# Patient Record
Sex: FEMALE | Race: WHITE | NOT HISPANIC OR LATINO | Employment: FULL TIME | URBAN - METROPOLITAN AREA
[De-identification: names, ages, dates, MRNs, and addresses within clinical notes are randomized per-mention and may not be internally consistent; named-entity substitution may affect disease eponyms.]

---

## 2017-03-06 DIAGNOSIS — F51.01 PRIMARY INSOMNIA: ICD-10-CM

## 2017-03-06 DIAGNOSIS — G47.9 SLEEP DISTURBANCE: ICD-10-CM

## 2017-03-06 RX ORDER — ZOLPIDEM TARTRATE 5 MG/1
5 TABLET ORAL
Qty: 90 TABLET | Refills: 0 | Status: SHIPPED | OUTPATIENT
Start: 2017-03-06 | End: 2017-05-31

## 2017-03-06 NOTE — TELEPHONE ENCOUNTER
ambien 5mg      Last Written Prescription Date:  11/30/16  Last Fill Quantity: 90,   # refills: 0  Last Office Visit with [facility] or [facility] prescribing provider: 11/27/16  Future Office visit:       Routing refill request to provider for review/approval because:  Drug not on the [facility], [facility] or [facility] refill protocol or controlled substance    Thank You~  Priscila Perez [facility]

## 2017-03-07 NOTE — TELEPHONE ENCOUNTER
Pt called to fu on RX as pt took her last pill last night.  Please advise  Thank you,  Damaris Ching  Patient Representative

## 2017-05-31 DIAGNOSIS — G47.9 SLEEP DISTURBANCE: ICD-10-CM

## 2017-05-31 DIAGNOSIS — F51.01 PRIMARY INSOMNIA: ICD-10-CM

## 2017-05-31 RX ORDER — ZOLPIDEM TARTRATE 5 MG/1
5 TABLET ORAL
Qty: 90 TABLET | Refills: 0 | Status: SHIPPED | OUTPATIENT
Start: 2017-05-31 | End: 2017-08-31

## 2017-05-31 NOTE — TELEPHONE ENCOUNTER
Zolpidem 5mg      Last Written Prescription Date: 3/6/2017  Last Fill Quantity: 90,  # refills: 0   Last Office Visit with [facility], [facility] or [facility] prescribing provider: 10/27/2016                                             Please fax or bring signed prescription to [facility].    Thank you,  [facility]

## 2017-08-31 DIAGNOSIS — G47.9 SLEEP DISTURBANCE: ICD-10-CM

## 2017-08-31 DIAGNOSIS — F51.01 PRIMARY INSOMNIA: ICD-10-CM

## 2017-08-31 RX ORDER — ZOLPIDEM TARTRATE 5 MG/1
5 TABLET ORAL
Qty: 90 TABLET | Refills: 0 | Status: SHIPPED | OUTPATIENT
Start: 2017-08-31 | End: 2017-10-12

## 2017-08-31 NOTE — TELEPHONE ENCOUNTER
Ambien      Last Written Prescription Date: 05/31/2017  Last Fill Quantity: 90,  # refills: 0   Last Office Visit with [facility] prescribing provider: 10/27/2016                                         Next 5 appointments (look out 90 days)     Oct 12, 2017  9:00 AM CDT   [facility]YSICAL with Kathy Chávez PA-C   [facility]    [address] [place] [id number] [phone]                  Thank You,  Gareth Rodriguez, Pharmacy Tech  [facility]

## 2017-10-12 ENCOUNTER — OFFICE VISIT (OUTPATIENT)
Dept: FAMILY MEDICINE | Facility: CLINIC | Age: 32
End: 2017-10-12
Payer: COMMERCIAL

## 2017-10-12 VITALS
BODY MASS INDEX: 33.92 KG/M2 | HEIGHT: 69 IN | WEIGHT: 229 LBS | OXYGEN SATURATION: 98 % | HEART RATE: 87 BPM | DIASTOLIC BLOOD PRESSURE: 82 MMHG | TEMPERATURE: 97.2 F | SYSTOLIC BLOOD PRESSURE: 122 MMHG | RESPIRATION RATE: 18 BRPM

## 2017-10-12 DIAGNOSIS — G47.9 SLEEP DISTURBANCE: ICD-10-CM

## 2017-10-12 DIAGNOSIS — L71.9 ROSACEA: ICD-10-CM

## 2017-10-12 DIAGNOSIS — Z00.01 ENCOUNTER FOR ROUTINE ADULT HEALTH EXAMINATION WITH ABNORMAL FINDINGS: Primary | ICD-10-CM

## 2017-10-12 DIAGNOSIS — F51.01 PRIMARY INSOMNIA: ICD-10-CM

## 2017-10-12 DIAGNOSIS — Z23 NEED FOR PROPHYLACTIC VACCINATION AND INOCULATION AGAINST INFLUENZA: ICD-10-CM

## 2017-10-12 DIAGNOSIS — L98.9 SKIN LESION: ICD-10-CM

## 2017-10-12 PROBLEM — E66.811 OBESITY (BMI 30.0-34.9): Status: ACTIVE | Noted: 2017-10-12

## 2017-10-12 LAB
CHOLEST SERPL-MCNC: 178 MG/DL
HDLC SERPL-MCNC: 61 MG/DL
LDLC SERPL CALC-MCNC: 83 MG/DL
NONHDLC SERPL-MCNC: 117 MG/DL
TRIGL SERPL-MCNC: 168 MG/DL

## 2017-10-12 PROCEDURE — 99395 PREV VISIT EST AGE 18-39: CPT | Mod: 25 | Performed by: PHYSICIAN ASSISTANT

## 2017-10-12 PROCEDURE — 90686 IIV4 VACC NO PRSV 0.5 ML IM: CPT | Performed by: PHYSICIAN ASSISTANT

## 2017-10-12 PROCEDURE — 80061 LIPID PANEL: CPT | Performed by: PHYSICIAN ASSISTANT

## 2017-10-12 PROCEDURE — 90471 IMMUNIZATION ADMIN: CPT | Performed by: PHYSICIAN ASSISTANT

## 2017-10-12 PROCEDURE — 36415 COLL VENOUS BLD VENIPUNCTURE: CPT | Performed by: PHYSICIAN ASSISTANT

## 2017-10-12 RX ORDER — ZOLPIDEM TARTRATE 5 MG/1
5 TABLET ORAL
Qty: 90 TABLET | Refills: 0 | Status: SHIPPED | OUTPATIENT
Start: 2017-10-12 | End: 2018-03-04

## 2017-10-12 ASSESSMENT — PAIN SCALES - GENERAL: PAINLEVEL: NO PAIN (0)

## 2017-10-12 NOTE — PROGRESS NOTES
Radha - your labs are completely normal. Slightly elevated triglyceride level - suggest fish oil daily.

## 2017-10-12 NOTE — MR AVS SNAPSHOT
After Visit Summary   10/12/2017    Radha Etienne    MRN: [id number]           Patient Information     Date Of Birth          1985        Visit Information        Provider Department      10/12/2017 9:00 AM Kathy Chávez PA-C [facility]        Today's Diagnoses     Rosacea    -  1    Primary insomnia        Sleep disturbance        Skin lesion        Encounter for routine adult health examination without abnormal findings          Care Instructions      Preventive Health Recommendations  Female Ages 26 - 39  Yearly exam:   See your health care provider every year in order to    Review health changes.     Discuss preventive care.      Review your medicines if you your doctor has prescribed any.    Until age 30: Get a Pap test every three years (more often if you have had an abnormal result).    After age 30: Talk to your doctor about whether you should have a Pap test every 3 years or have a Pap test with HPV screening every 5 years.   You do not need a Pap test if your uterus was removed (hysterectomy) and you have not had cancer.  You should be tested each year for STDs (sexually transmitted diseases), if you're at risk.   Talk to your provider about how often to have your cholesterol checked.  If you are at risk for diabetes, you should have a diabetes test (fasting glucose).  Shots: Get a flu shot each year. Get a tetanus shot every 10 years.   Nutrition:     Eat at least 5 servings of fruits and vegetables each day.    Eat whole-grain bread, whole-wheat pasta and brown rice instead of white grains and rice.    Talk to your provider about Calcium and Vitamin D.     Lifestyle    Exercise at least 150 minutes a week (30 minutes a day, 5 days of the week). This will help you control your weight and prevent disease.    Limit alcohol to one drink per day.    No smoking.     Wear sunscreen to prevent skin cancer.    See your dentist every six months for an exam and  cleaning.            Follow-ups after your visit        Additional Services     DERMATOLOGY REFERRAL       Your provider has referred you to: BERAT: [facility] [phone]   [email or web address]    Please be aware that coverage of these services is subject to the terms and limitations of your health insurance plan.  Call member services at your health plan with any benefit or coverage questions.      Please bring the following with you to your appointment:    (1) Any X-Rays, CTs or MRIs which have been performed.  Contact the facility where they were done to arrange for  prior to your scheduled appointment.    (2) List of current medications  (3) This referral request   (4) Any documents/labs given to you for this referral                  Who to contact     If you have questions or need follow up information about today's clinic visit or your schedule please contact [facility] directly at [phone].  Normal or non-critical lab and imaging results will be communicated to you by MyChart, letter or phone within 4 business days after the clinic has received the results. If you do not hear from us within 7 days, please contact the clinic through CITIAhart or phone. If you have a critical or abnormal lab result, we will notify you by phone as soon as possible.  Submit refill requests through [employer] or call your pharmacy and they will forward the refill request to us. Please allow 3 business days for your refill to be completed.          Additional Information About Your Visit        CITIAhar[employer]Surance Information     [employer] gives you secure access to your electronic health record. If you see a primary care provider, you can also send messages to your care team and make appointments. If you have questions, please call your primary care clinic.  If you do not have a primary care provider, please call [phone] and they will assist you.        Care  "EveryWhere ID     This is your Care EveryWhere ID. This could be used by other organizations to access your [facility] medical records  [id number]        Your Vitals Were     Pulse Temperature Respirations Height Pulse Oximetry BMI (Body Mass Index)    87 97.2  F (36.2  C) (Tympanic) 18 5' 8.6\" (1.742 m) 98% 34.21 kg/m2       Blood Pressure from Last 3 Encounters:   10/12/17 122/82   10/27/16 110/60   10/05/16 122/82    Weight from Last 3 Encounters:   10/12/17 229 lb (103.9 kg)   10/27/16 256 lb 4 oz (116.2 kg)   10/05/16 248 lb (112.5 kg)              We Performed the Following     DERMATOLOGY REFERRAL     Lipid Profile (Chol, Trig, HDL, LDL calc)          Where to get your medicines      Some of these will need a paper prescription and others can be bought over the counter.  Ask your nurse if you have questions.     Bring a paper prescription for each of these medications     zolpidem 5 MG tablet          Primary Care Provider Office Phone # Fax #    Kathy Chávez PA-C [phone] [address] [place] [address]        Equal Access to Services     BARBARA YEN AH: Hadii tj chamberso Sodevanali, waaxda luqadaha, qaybta kaalmada adeegyada, jordan weller. So [facility] [phone].    ATENCIÓN: Si habla español, tiene a nassar disposición servicios gratuitos de asistencia lingüística. Nicki al [phone].    We comply with applicable federal civil rights laws and [place] laws. We do not discriminate on the basis of race, color, national origin, age, disability, sex, sexual orientation, or gender identity.            Thank you!     Thank you for choosing [facility]  for your care. Our goal is always to provide you with excellent care. Hearing back from our patients is one way we can continue to improve our services. Please take a few minutes to complete the written survey that you may receive in the mail after your visit with us. Thank you!           "   Your Updated Medication List - Protect others around you: Learn how to safely use, store and throw away your medicines at www.disposemymeds.org.          This list is accurate as of: 10/12/17  9:41 AM.  Always use your most recent med list.                   Brand Name Dispense Instructions for use Diagnosis    augmented betamethasone dipropionate 0.05 % cream    DIPROLENE-AF    50 g    APPLY TOPICALLY TO RASH TWO TIMES A DAY    Rash       levonorgestrel 20 MCG/24HR IUD    MIRENA    1 each    1 each (20 mcg) by Intrauterine route once for 1 dose    Intrauterine device surveillance       zolpidem 5 MG tablet    AMBIEN    90 tablet    Take 1 tablet (5 mg) by mouth nightly as needed for sleep    Primary insomnia, Sleep disturbance

## 2017-10-12 NOTE — NURSING NOTE
"Chief Complaint   Patient presents with     Physical       Initial /82  Pulse 87  Temp 97.2  F (36.2  C) (Tympanic)  Resp 18  Ht 5' 8.6\" (1.742 m)  Wt 229 lb (103.9 kg)  SpO2 98%  BMI 34.21 kg/m2 Estimated body mass index is 34.21 kg/(m^2) as calculated from the following:    Height as of this encounter: 5' 8.6\" (1.742 m).    Weight as of this encounter: 229 lb (103.9 kg).  BP completed using cuff size: jaky Ramires MA      "

## 2017-10-12 NOTE — PROGRESS NOTES
SUBJECTIVE:   CC: Radha Etienne is an 32 year old woman who presents for preventive health visit.     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 Uses this every day for sleep. Does need refills.      Today's PHQ-2 Score:   PHQ-2 ( 1999 Pfizer) 10/7/2017   Q1: Little interest or pleasure in doing things 0   Q2: Feeling down, depressed or hopeless 0   PHQ-2 Score 0   Q1: Little interest or pleasure in doing things Not at all   Q2: Feeling down, depressed or hopeless Not at all   PHQ-2 Score 0       Abuse: Current or Past(Physical, Sexual or Emotional)- No  Do you feel safe in your environment - Yes    Social History   Substance Use Topics     Smoking status: Never Smoker     Smokeless tobacco: Never Used     Alcohol use Yes      Comment: occassionally when not pregnant.     The patient does not drink >3 drinks per day nor >7 drinks per week.    Reviewed orders with patient.  Reviewed health maintenance and updated orders accordingly - Yes  Patient Active Problem List   Diagnosis     CARDIOVASCULAR SCREENING; LDL GOAL LESS THAN 160     Sleep disturbance     Encounter for IUD insertion     Primary insomnia     Rosacea     Past Surgical History:   Procedure Laterality Date     HC TOOTH EXTRACTION W/FORCEP      wisdom teeth       Social History   Substance Use Topics     Smoking status: Never Smoker     Smokeless tobacco: Never Used     Alcohol use Yes      Comment: occassionally when not pregnant.     Family History    Problem Relation Age of Onset     Obesity Mother      Breast Cancer Maternal Grandmother      60's     CANCER Maternal Grandmother      bladder and ovarian     Thyroid Disease Maternal Grandmother      Obesity Maternal Grandmother      Hypertension Maternal Grandfather      HEART DISEASE Maternal Grandfather      Bypass surgery     Lipids Maternal Grandfather      Alcohol/Drug Maternal Grandfather      Recovered alcoholic     Hypertension Paternal Grandfather      Depression Brother      bi-polar     Asthma Brother      childhood             Mammogram not appropriate for this patient based on age.    Pertinent mammograms are reviewed under the imaging tab.  History of abnormal Pap smear: NO - age 30-65 PAP every 5 years with negative HPV co-testing recommended    Reviewed and updated as needed this [facility]  Meds         Reviewed and updated as needed this visit by Provider            ROS:  C: NEGATIVE for fever, chills, change in weight  INTEGUMENTARY/SKIN: Persistent facial rash with previous diagnosis of rosacea untreated; growing skin lesion right buttock  E: NEGATIVE for vision changes or irritation  ENT: NEGATIVE for ear, mouth and throat problems  R: NEGATIVE for significant cough or SOB  B: NEGATIVE for masses, tenderness or discharge  CV: NEGATIVE for chest pain, palpitations or peripheral edema  GI: NEGATIVE for nausea, abdominal pain, heartburn, or change in bowel habits  : NEGATIVE for unusual urinary or vaginal symptoms. Periods are regular.  M: NEGATIVE for significant arthralgias or myalgia  N: NEGATIVE for weakness, dizziness or paresthesias  E: NEGATIVE for temperature intolerance, skin/hair changes  H: NEGATIVE for bleeding problems  P: NEGATIVE for changes in mood or affect     OBJECTIVE:   There were no vitals taken for this visit.  EXAM:  GENERAL: healthy, alert and no distress  EYES: Eyes grossly normal to inspection, PERRL and conjunctivae and sclerae  normal  HENT: ear canals and TM's normal, nose and mouth without ulcers or lesions  NECK: no adenopathy, no asymmetry, masses, or scars and thyroid normal to palpation  RESP: lungs clear to auscultation - no rales, rhonchi or wheezes  BREAST: normal without masses, tenderness or nipple discharge and no palpable axillary masses or adenopathy  CV: regular rate and rhythm, normal S1 S2, no S3 or S4, no murmur, click or rub, no peripheral edema and peripheral pulses strong  ABDOMEN: soft, nontender, no hepatosplenomegaly, no masses and bowel sounds normal   (female): normal female external genitalia, normal urethral meatus, vaginal mucosa pink, moist, well rugated, and normal cervix/adnexa/uterus without masses or discharge   (female): normal female external genitalia, normal urethral meatus , vaginal mucosa pink, moist, well rugated and cervix appears normal-IUD strings are extremely difficult to see approximately 2 mm long.  MS: no gross musculoskeletal defects noted, no edema  SKIN: 1.5 cm skin toned raised oval lesion right upper buttock.  Papular rosacea noted bilateral maxillary regions sparing the nose.  NEURO: Normal strength and tone, mentation intact and speech normal  PSYCH: mentation appears normal, affect normal/bright    ASSESSMENT/PLAN:       ICD-10-CM    1. Encounter for routine adult health examination with abnormal findings Z00.01 Lipid Profile (Chol, Trig, HDL, LDL calc)   2. Primary insomnia F51.01 zolpidem (AMBIEN) 5 MG tablet   3. Sleep disturbance G47.9 zolpidem (AMBIEN) 5 MG tablet   4. Rosacea L71.9 DERMATOLOGY REFERRAL   5. Skin lesion L98.9 DERMATOLOGY REFERRAL   6. Need for prophylactic vaccination and inoculation against influenza Z23 FLU VAC, SPLIT VIRUS IM > 3 YO (QUADRIVALENT) [14111]     Vaccine Administration, Initial [61267]       COUNSELING:  Reviewed preventive health counseling, as reflected in patient instructions       Regular exercise       Healthy diet/nutrition       Vision  "screening       Immunizations    Vaccinated for: Influenza           Osteoporosis Prevention/Bone Health           reports that she has never smoked. She has never used smokeless tobacco.    Estimated body mass index is 38.96 kg/(m^2) as calculated from the following:    Height as of 9/29/16: 5' 8\" (1.727 m).    Weight as of 10/27/16: 256 lb 4 oz (116.2 kg).   Weight management plan: Discussed healthy diet and exercise guidelines and patient will follow up in 12 months in clinic to re-evaluate.     I have suggested dermatology consult to her in the past given the significance of the rosacea that she has-she would also like potential removal of this large lesion on her buttock. Will get this coordinated today.    Ambien prescription refill-stable on this medication.    Counseling Resources:  ATP IV Guidelines  Pooled Cohorts Equation Calculator  Breast Cancer Risk Calculator  FRAX Risk Assessment  ICSI Preventive Guidelines  Dietary Guidelines for Americans, 2010  [employer]s MyPlate  ASA Prophylaxis  Lung CA Screening    Kathy Chávez PA-C  [facility]    Orders Placed This Encounter     FLU VAC, SPLIT VIRUS IM > 3 YO (QUADRIVALENT) [05712]     Vaccine Administration, Initial [[id number]]     Lipid Profile (Chol, Trig, HDL, LDL calc)     DERMATOLOGY REFERRAL     zolpidem (AMBIEN) 5 MG tablet       AVS given to patient upon discharge today.  Electronically signed by Kathy Chávez PA-C  October 12, 2017  10:38 AM      Injectable Influenza Immunization Documentation    1.  Is the person to be vaccinated sick today?   No    2. Does the person to be vaccinated have an allergy to a component   of the vaccine?   No    3. Has the person to be vaccinated ever had a serious reaction   to influenza vaccine in the past?   No    4. Has the person to be vaccinated ever had Guillain-Barré syndrome?   No    Form completed by Marquita Ramires MA            "

## 2017-10-12 NOTE — NURSING NOTE
Prior to injection verified patient identity using patient's name and date of birth.  Per orders of Kathy Chávez injection of Flu  given by Marquita Ramires MA. Patient instructed to remain in clinic for 20 minutes afterwards and to report any adverse reaction to me immediately.

## 2018-01-18 ENCOUNTER — OFFICE VISIT (OUTPATIENT)
Dept: DERMATOLOGY | Facility: CLINIC | Age: 33
End: 2018-01-18
Payer: COMMERCIAL

## 2018-01-18 VITALS — HEART RATE: 80 BPM | OXYGEN SATURATION: 98 % | DIASTOLIC BLOOD PRESSURE: 81 MMHG | SYSTOLIC BLOOD PRESSURE: 126 MMHG

## 2018-01-18 DIAGNOSIS — L71.9 ACNE ROSACEA: Primary | ICD-10-CM

## 2018-01-18 DIAGNOSIS — D48.5 NEOPLASM OF UNCERTAIN BEHAVIOR OF SKIN: ICD-10-CM

## 2018-01-18 DIAGNOSIS — R23.8 PIEZOGENIC PEDAL PAPULE: ICD-10-CM

## 2018-01-18 PROCEDURE — 88305 TISSUE EXAM BY PATHOLOGIST: CPT | Performed by: PHYSICIAN ASSISTANT

## 2018-01-18 PROCEDURE — 88313 SPECIAL STAINS GROUP 2: CPT | Performed by: PHYSICIAN ASSISTANT

## 2018-01-18 PROCEDURE — 88341 IMHCHEM/IMCYTCHM EA ADD ANTB: CPT | Performed by: PHYSICIAN ASSISTANT

## 2018-01-18 PROCEDURE — 11100 HC BIOPSY SKIN/SUBQ/MUC MEM, SINGLE LESION: CPT | Performed by: PHYSICIAN ASSISTANT

## 2018-01-18 PROCEDURE — 99203 OFFICE O/P NEW LOW 30 MIN: CPT | Mod: 25 | Performed by: PHYSICIAN ASSISTANT

## 2018-01-18 PROCEDURE — 88342 IMHCHEM/IMCYTCHM 1ST ANTB: CPT | Performed by: PHYSICIAN ASSISTANT

## 2018-01-18 RX ORDER — FLUTICASONE PROPIONATE 50 MCG
1 SPRAY, SUSPENSION (ML) NASAL DAILY
COMMUNITY
End: 2018-11-27

## 2018-01-18 RX ORDER — DOXYCYCLINE 100 MG/1
100 CAPSULE ORAL
Qty: 30 CAPSULE | Refills: 1 | Status: SHIPPED | OUTPATIENT
Start: 2018-01-18 | End: 2018-11-27

## 2018-01-18 NOTE — MR AVS SNAPSHOT
After Visit Summary   1/18/2018    Radha Etienne    MRN: [id number]           Patient Information     Date Of Birth          1985        Visit Information        Provider Department      1/18/2018 9:20 AM Helene Pitts PA-C [facility]        Today's Diagnoses     Acne rosacea    -  1      Care Instructions    Take Doxycycline 100 mg daily with food for next month.   Use sulfacetamide wash twice daily.   Apply metrocream once to twice daily .  Recheck in 2 months.   Wound Care Instructions     FOR SUPERFICIAL WOUNDS     AFTER 24 HOURS YOU SHOULD REMOVE THE BANDAGE AND BEGIN DAILY DRESSING CHANGES AS FOLLOWS:     1) Remove Dressing.     2) Clean and dry the area with tap water using a Q-tip or sterile gauze pad.     3) Apply Polysporin ointment or Bacitracin ointment over entire wound.  Do NOT use Neosporin ointment.     4) Cover the wound with a band-aid, or a sterile non-stick gauze pad and micropore paper tape      REPEAT THESE INSTRUCTIONS AT LEAST ONCE A DAY UNTIL THE WOUND HAS COMPLETELY HEALED.    It is an old wives tale that a wound heals better when it is exposed to air and allowed to dry out. The wound will heal faster with a better cosmetic result if it is kept moist with ointment and covered with a bandage.    **Do not let the wound dry out.**      Supplies Needed:      *Cotton tipped applicators (Q-tips)    *Polysporin Ointment or Bacitracin Ointment (NOT NEOSPORIN)    *Band-aids or non-stick gauze pads and micropore paper tape.    PATIENT INFORMATION:    During the healing process you will notice a number of changes. All wounds develop a small halo of redness surrounding the wound.  This means healing is occurring. Severe itching with extensive redness usually indicates sensitivity to the ointment or bandage tape used to dress the wound.  You should call our office if this develops.      Swelling  and/or discoloration around your surgical site is common,  particularly when performed around the eye.    All wounds normally drain.  The larger the wound the more drainage there will be.  After 7-10 days, you will notice the wound beginning to shrink and new skin will begin to grow.  The wound is healed when you can see skin has formed over the entire area.  A healed wound has a healthy, shiny look to the surface and is red to dark pink in color to normalize.  Wounds may take approximately 4-6 weeks to heal.  Larger wounds may take 6-8 weeks.  After the wound is healed you may discontinue dressing changes.    You may experience a sensation of tightness as your wound heals. This is normal and will gradually subside.    Your healed wound may be sensitive to temperature changes. This sensitivity improves with time, but if you re having a lot of discomfort, try to avoid temperature extremes.    Patients frequently experience itching after their wound appears to have healed because of the continue healing under the skin.  Plain Vaseline will help relieve the itching.    BLEEDIN. Leave the bandage in place.  2. Use tightly rolled up gauze or a cloth to apply direct pressure over the bandage for 20 minutes.  3. Reapply pressure for an additional 20 minutes if necessary  4. Call the office or go to the nearest emergency room if pressure fails to stop the bleeding.  5. Use additional gauze and tape to maintain pressure once the bleeding has stopped.  6. If pressure alone does not stop the bleeding, call the Dermatology Clinic at [phone] or go to the nearest Emergency room.            Follow-ups after your visit        Who to contact     If you have questions or need follow up information about today's clinic visit or your schedule please contact [facility] directly at [phone].  Normal or non-critical lab and imaging results will be communicated to you by MyChart, letter or phone within 4 business days after the clinic has received the results. If you  do not hear from us within 7 days, please contact the clinic through Scribz or phone. If you have a critical or abnormal lab result, we will notify you by phone as soon as possible.  Submit refill requests through Scribz or call your pharmacy and they will forward the refill request to us. Please allow 3 business days for your refill to be completed.          Additional Information About Your Visit        proVITALhart Information     Scribz gives you secure access to your electronic health record. If you see a primary care provider, you can also send messages to your care team and make appointments. If you have questions, please call your primary care clinic.  If you do not have a primary care provider, please call [phone] and they will assist you.        Care EveryWhere ID     This is your Care EveryWhere ID. This could be used by other organizations to access your [facility] medical records  [id number]        Your Vitals Were     Pulse Pulse Oximetry                80 98%           Blood Pressure from Last 3 Encounters:   01/18/18 126/81   10/12/17 122/82   10/27/16 110/60    Weight from Last 3 Encounters:   10/12/17 103.9 kg (229 lb)   10/27/16 116.2 kg (256 lb 4 oz)   10/05/16 112.5 kg (248 lb)              Today, you had the following     No orders found for display         Today's Medication Changes          These changes are accurate as of: 1/18/18  9:44 AM.  If you have any questions, ask your nurse or doctor.               Start taking these medicines.        Dose/Directions    doxycycline monohydrate 100 MG capsule   Used for:  Acne rosacea   Started by:  Helene Pitts PA-C        Dose:  100 mg   Take 1 capsule (100 mg) by mouth daily with food   Quantity:  30 capsule   Refills:  1       metroNIDAZOLE 0.75 % cream   Commonly known as:  METROCREAM   Used for:  Acne rosacea   Started by:  Helene Pitts PA-C        Apply twice daily to face   Quantity:  45 g   Refills:  11        sulfacetamide sodium-sulfur 10-5 % Emul   Used for:  Acne rosacea   Started by:  Helene Pitts PA-C        Use to wash face twice daily   Quantity:  227 g   Refills:  3            Where to get your medicines      These medications were sent to [facility] [place], [place] - [address]  [address] [facility] [address] [address]     Phone:  [phone]     doxycycline monohydrate 100 MG capsule    metroNIDAZOLE 0.75 % cream    sulfacetamide sodium-sulfur 10-5 % Emul                Primary Care Provider Office Phone # Fax #    Kathy ROBERTS RYAN Chávez [phone]       [address] [facility]   [facility] [place] [address]        Equal Access to Services     BARBARA YEN : Hadii tj chamberso Somarcial, waaxda luqadaha, qaybta kaalmada adeegyada, jordan weller. So [facility] [phone].    ATENCIÓN: Si habla español, tiene a nassar disposición servicios gratuitos de asistencia lingüística. Llame al [phone].    We comply with applicable federal civil rights laws and [place] laws. We do not discriminate on the basis of race, color, national origin, age, disability, sex, sexual orientation, or gender identity.            Thank you!     Thank you for choosing [facility]  for your care. Our goal is always to provide you with excellent care. Hearing back from our patients is one way we can continue to improve our services. Please take a few minutes to complete the written survey that you may receive in the mail after your visit with us. Thank you!             Your Updated Medication List - Protect others around you: Learn how to safely use, store and throw away your medicines at www.disposemymeds.org.          This list is accurate as of: 1/18/18  9:44 AM.  Always use your most recent med list.                   Brand Name Dispense Instructions for use Diagnosis    augmented betamethasone dipropionate 0.05 % cream    DIPROLENE-AF    50 g    APPLY TOPICALLY TO  RASH TWO TIMES A DAY    Rash       doxycycline monohydrate 100 MG capsule     30 capsule    Take 1 capsule (100 mg) by mouth daily with food    Acne rosacea       fluticasone 50 MCG/ACT spray    FLONASE     Spray 1 spray into both nostrils daily        IBUPROFEN PO           levonorgestrel 20 MCG/24HR IUD    MIRENA    1 each    1 each (20 mcg) by Intrauterine route once for 1 dose    Intrauterine device surveillance       MELATONIN PO           metroNIDAZOLE 0.75 % cream    METROCREAM    45 g    Apply twice daily to face    Acne rosacea       sulfacetamide sodium-sulfur 10-5 % Emul     227 g    Use to wash face twice daily    Acne rosacea       TYLENOL PO           zolpidem 5 MG tablet    AMBIEN    90 tablet    Take 1 tablet (5 mg) by mouth nightly as needed for sleep    Primary insomnia, Sleep disturbance

## 2018-01-18 NOTE — LETTER
1/18/2018         RE: Radha Eitenne  [address]  [facility] [address]        Dear Colleague,    Thank you for referring your patient, Radha Etienne, to the [facility]. Please see a copy of my visit note below.    Radha Etienne is a 32 year old year old female patient here today for consult of redness on face, growth on buttock, and growth on foot by Kathy Chávez PA-C.  Patient reports that she has had redness and flushing for year. She has noticed more bumps on cheek recently. She has not yet tried anything for her rosacea. She also developed a bump on left foot a few months ago. Patient has no other skin complaints today.  Remainder of the HPI, Meds, PMH, Allergies, FH, and SH was reviewed in chart.    Pertinent Hx:  No personal history of skin cancer.   Past Medical History:   Diagnosis Date     Eczema      Encounter for IUD insertion 3/17/2015    Mirena IUD placed.       Past Surgical History:   Procedure Laterality Date     HC TOOTH EXTRACTION W/FORCEP      wisdom teeth        Family History   Problem Relation Age of Onset     Obesity Mother      Breast Cancer Maternal Grandmother      60's     CANCER Maternal Grandmother      bladder and ovarian     Thyroid Disease Maternal Grandmother      Obesity Maternal Grandmother      Hypertension Maternal Grandfather      HEART DISEASE Maternal Grandfather      Bypass surgery     Lipids Maternal Grandfather      Alcohol/Drug Maternal Grandfather      Recovered alcoholic     Hypertension Paternal Grandfather      Depression Brother      bi-polar     Asthma Brother      childhood       Social History     Social History     Marital status:      Spouse name: Galen     Number of children: 1     Years of education: 16     Occupational History      [employer]     Social History Main Topics     Smoking status: Never Smoker     Smokeless tobacco: Never Used     Alcohol use Yes      Comment:  occassionally when not pregnant.     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 works nights and needs help falling asleep     Stress Concern No     Weight Concern Yes     pt states she has always struggled with her weight     Special Diet No     Back Care Yes     Exercise Yes     tries to walk on treadmill and does pushups and situps.     Bike Helmet No     Seat Belt Yes     Self-Exams No     Social History Narrative     to Galen and lives in [place].  No smokers in the home.  Has indoor cats/discussed toxoplasmosis precautions.  No concerns about domestic violence.       Outpatient Encounter Prescriptions as of 1/18/2018   Medication Sig Dispense Refill     IBUPROFEN PO        Acetaminophen (TYLENOL PO)        fluticasone (FLONASE) 50 MCG/ACT spray Spray 1 spray into both nostrils daily       MELATONIN PO        doxycycline monohydrate 100 MG capsule Take 1 capsule (100 mg) by mouth daily with food 30 capsule 1     sulfacetamide sodium-sulfur 10-5 % EMUL Use to wash face twice daily 227 g 3     metroNIDAZOLE (METROCREAM) 0.75 % cream Apply twice daily to face 45 g 11     zolpidem (AMBIEN) 5 MG tablet Take 1 tablet (5 mg) by mouth nightly as needed for sleep 90 tablet 0     augmented betamethasone dipropionate (DIPROLENE-AF) 0.05 % cream APPLY TOPICALLY TO RASH TWO TIMES A DAY (Patient not taking: Reported on 10/12/2017) 50 g 0     levonorgestrel (MIRENA) 20 MCG/24HR IUD 1 each (20 mcg) by Intrauterine route once for 1 dose 1 each 0     No facility-administered encounter medications on file as of 1/18/2018.              Review Of Systems  Skin: As above  Eyes: negative  Ears/Nose/Throat: negative  Respiratory: No shortness of  breath, dyspnea on exertion, cough, or hemoptysis  Cardiovascular: negative  Gastrointestinal: negative  Genitourinary: negative  Musculoskeletal: negative  Neurologic: negative  Psychiatric: negative  Hematologic/Lymphatic/Immunologic: negative  Endocrine: negative      O:   NAD, WDWN, Alert & Oriented, Mood & Affect wnl, Vitals stable   Here today alone   /81 (BP Location: Left arm, Patient Position: Sitting, Cuff Size: Adult Large)  Pulse 80  SpO2 98%   General appearance normal   Vitals stable   Alert, oriented and in no acute distress      1.3 cm pink papule on right buttock   Soft subcutaneous nodule on left medial foot near plantar arch   Back ground erythema with telangiectasia, few inflammatory papules on face    Eyes: Conjunctivae/lids:Normal     ENT: Lips    MSK:Normal    Pulm: Breathing Normal    Neuro/Psych: Orientation:Normal; Mood/Affect:Normal  A/P:  1. R/O neurofibroma on right buttock   TANGENTIAL BIOPSY SENT OUT:  After consent, anesthesia with LEC and prep, tangential excision performed and specimen sent out for permanent section histology.  No complications and routine wound care. Patient told to call our office in 1-2 weeks for result.      2. Acne Rosacea  Take Doxycycline 100 mg daily with food for next month.   Use sulfacetamide wash twice daily.   Apply metrocream once to twice daily .  Recheck in 2 months.   3. Favor Piezogenic pedal papule on left foot  Discussed pathophysiology, informational handout was given.   BENIGN LESIONS DISCUSSED WITH PATIENT:  I discussed the specifics of tumor, prognosis, and genetics of benign lesions.  I explained that treatment of these lesions would be purely cosmetic and not medically neccessary.  I discussed with patient different removal options including excision, cautery and /or laser.      Nature and genetics of benign skin lesions dicussed with patient.  Signs and Symptoms of skin cancer discussed with patient.  ABCDEs of melanoma reviewed  with patient.  Patient encouraged to perform monthly skin exams.  UV precautions reviewed with patient.  Skin care regimen reviewed with patient: Eliminate harsh soaps, i.e. Dial, zest, irsih spring; Mild soaps such as Cetaphil or Dove sensitive skin, avoid hot or cold showers, aggressive use of emollients including vanicream, cetaphil or cerave discussed with patient.    Risks of non-melanoma skin cancer discussed with patient   Return to clinic 2-3 months.     Again, thank you for allowing me to participate in the care of your patient.        Sincerely,        Helene Rivera PA-C

## 2018-01-18 NOTE — PATIENT INSTRUCTIONS
Take Doxycycline 100 mg daily with food for next month.   Use sulfacetamide wash twice daily.   Apply metrocream once to twice daily .  Recheck in 2 months.   Wound Care Instructions     FOR SUPERFICIAL WOUNDS     AFTER 24 HOURS YOU SHOULD REMOVE THE BANDAGE AND BEGIN DAILY DRESSING CHANGES AS FOLLOWS:     1) Remove Dressing.     2) Clean and dry the area with tap water using a Q-tip or sterile gauze pad.     3) Apply Polysporin ointment or Bacitracin ointment over entire wound.  Do NOT use Neosporin ointment.     4) Cover the wound with a band-aid, or a sterile non-stick gauze pad and micropore paper tape      REPEAT THESE INSTRUCTIONS AT LEAST ONCE A DAY UNTIL THE WOUND HAS COMPLETELY HEALED.    It is an old wives tale that a wound heals better when it is exposed to air and allowed to dry out. The wound will heal faster with a better cosmetic result if it is kept moist with ointment and covered with a bandage.    **Do not let the wound dry out.**      Supplies Needed:      *Cotton tipped applicators (Q-tips)    *Polysporin Ointment or Bacitracin Ointment (NOT NEOSPORIN)    *Band-aids or non-stick gauze pads and micropore paper tape.    PATIENT INFORMATION:    During the healing process you will notice a number of changes. All wounds develop a small halo of redness surrounding the wound.  This means healing is occurring. Severe itching with extensive redness usually indicates sensitivity to the ointment or bandage tape used to dress the wound.  You should call our office if this develops.      Swelling  and/or discoloration around your surgical site is common, particularly when performed around the eye.    All wounds normally drain.  The larger the wound the more drainage there will be.  After 7-10 days, you will notice the wound beginning to shrink and new skin will begin to grow.  The wound is healed when you can see skin has formed over the entire area.  A healed wound has a healthy, shiny look to the surface  and is red to dark pink in color to normalize.  Wounds may take approximately 4-6 weeks to heal.  Larger wounds may take 6-8 weeks.  After the wound is healed you may discontinue dressing changes.    You may experience a sensation of tightness as your wound heals. This is normal and will gradually subside.    Your healed wound may be sensitive to temperature changes. This sensitivity improves with time, but if you re having a lot of discomfort, try to avoid temperature extremes.    Patients frequently experience itching after their wound appears to have healed because of the continue healing under the skin.  Plain Vaseline will help relieve the itching.    BLEEDIN. Leave the bandage in place.  2. Use tightly rolled up gauze or a cloth to apply direct pressure over the bandage for 20 minutes.  3. Reapply pressure for an additional 20 minutes if necessary  4. Call the office or go to the nearest emergency room if pressure fails to stop the bleeding.  5. Use additional gauze and tape to maintain pressure once the bleeding has stopped.  6. If pressure alone does not stop the bleeding, call the Dermatology Clinic at [phone] or go to the nearest Emergency room.

## 2018-01-18 NOTE — NURSING NOTE
Chief Complaint   Patient presents with     Rosacea       Vitals:    01/18/18 0919   BP: 126/81   BP Location: Left arm   Patient Position: Sitting   Cuff Size: Adult Large   Pulse: 80   SpO2: 98%     Wt Readings from Last 1 Encounters:   10/12/17 103.9 kg (229 lb)       Velma Moore LPN.................1/18/2018

## 2018-01-22 NOTE — PROGRESS NOTES
Radha Etienne is a 32 year old year old female patient here today for consult of redness on face, growth on buttock, and growth on foot by Kathy Chávez PA-C.  Patient reports that she has had redness and flushing for year. She has noticed more bumps on cheek recently. She has not yet tried anything for her rosacea. She also developed a bump on left foot a few months ago. Patient has no other skin complaints today.  Remainder of the HPI, Meds, PMH, Allergies, FH, and SH was reviewed in chart.    Pertinent Hx:  No personal history of skin cancer.   Past Medical History:   Diagnosis Date     Eczema      Encounter for IUD insertion 3/17/2015    Mirena IUD placed.       Past Surgical History:   Procedure Laterality Date     HC TOOTH EXTRACTION W/FORCEP      wisdom teeth        Family History   Problem Relation Age of Onset     Obesity Mother      Breast Cancer Maternal Grandmother      60's     CANCER Maternal Grandmother      bladder and ovarian     Thyroid Disease Maternal Grandmother      Obesity Maternal Grandmother      Hypertension Maternal Grandfather      HEART DISEASE Maternal Grandfather      Bypass surgery     Lipids Maternal Grandfather      Alcohol/Drug Maternal Grandfather      Recovered alcoholic     Hypertension Paternal Grandfather      Depression Brother      bi-polar     Asthma Brother      childhood       Social History     Social History     Marital status:      Spouse name: Galen     Number of children: 1     Years of education: 16     Occupational History      [employer]     Social History Main Topics     Smoking status: Never Smoker     Smokeless tobacco: Never Used     Alcohol use Yes      Comment: occassionally when not pregnant.     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 works nights and needs help falling asleep     Stress Concern No     Weight Concern Yes     pt states she has always struggled with her weight     Special Diet No     Back Care Yes     Exercise Yes     tries to walk on treadmill and does pushups and situps.     Bike Helmet No     Seat Belt Yes     Self-Exams No     Social History Narrative     to Galen and lives in [place].  No smokers in the home.  Has indoor cats/discussed toxoplasmosis precautions.  No concerns about domestic violence.       Outpatient Encounter Prescriptions as of 1/18/2018   Medication Sig Dispense Refill     IBUPROFEN PO        Acetaminophen (TYLENOL PO)        fluticasone (FLONASE) 50 MCG/ACT spray Spray 1 spray into both nostrils daily       MELATONIN PO        doxycycline monohydrate 100 MG capsule Take 1 capsule (100 mg) by mouth daily with food 30 capsule 1     sulfacetamide sodium-sulfur 10-5 % EMUL Use to wash face twice daily 227 g 3     metroNIDAZOLE (METROCREAM) 0.75 % cream Apply twice daily to face 45 g 11     zolpidem (AMBIEN) 5 MG tablet Take 1 tablet (5 mg) by mouth nightly as needed for sleep 90 tablet 0     augmented betamethasone dipropionate (DIPROLENE-AF) 0.05 % cream APPLY TOPICALLY TO RASH TWO TIMES A DAY (Patient not taking: Reported on 10/12/2017) 50 g 0     levonorgestrel (MIRENA) 20 MCG/24HR IUD 1 each (20 mcg) by Intrauterine route once for 1 dose 1 each 0     No facility-administered encounter medications on file as of 1/18/2018.              Review Of Systems  Skin: As above  Eyes: negative  Ears/Nose/Throat: negative  Respiratory: No shortness of breath, dyspnea on exertion, cough, or hemoptysis  Cardiovascular: negative  Gastrointestinal: negative  Genitourinary: negative  Musculoskeletal: negative  Neurologic: negative  Psychiatric: negative  Hematologic/Lymphatic/Immunologic: negative  Endocrine:  negative      O:   NAD, WDWN, Alert & Oriented, Mood & Affect wnl, Vitals stable   Here today alone   /81 (BP Location: Left arm, Patient Position: Sitting, Cuff Size: Adult Large)  Pulse 80  SpO2 98%   General appearance normal   Vitals stable   Alert, oriented and in no acute distress      1.3 cm pink papule on right buttock   Soft subcutaneous nodule on left medial foot near plantar arch   Back ground erythema with telangiectasia, few inflammatory papules on face    Eyes: Conjunctivae/lids:Normal     ENT: Lips    MSK:Normal    Pulm: Breathing Normal    Neuro/Psych: Orientation:Normal; Mood/Affect:Normal  A/P:  1. R/O neurofibroma on right buttock   TANGENTIAL BIOPSY SENT OUT:  After consent, anesthesia with LEC and prep, tangential excision performed and specimen sent out for permanent section histology.  No complications and routine wound care. Patient told to call our office in 1-2 weeks for result.      2. Acne Rosacea  Take Doxycycline 100 mg daily with food for next month.   Use sulfacetamide wash twice daily.   Apply metrocream once to twice daily .  Recheck in 2 months.   3. Favor Piezogenic pedal papule on left foot  Discussed pathophysiology, informational handout was given.   BENIGN LESIONS DISCUSSED WITH PATIENT:  I discussed the specifics of tumor, prognosis, and genetics of benign lesions.  I explained that treatment of these lesions would be purely cosmetic and not medically neccessary.  I discussed with patient different removal options including excision, cautery and /or laser.      Nature and genetics of benign skin lesions dicussed with patient.  Signs and Symptoms of skin cancer discussed with patient.  ABCDEs of melanoma reviewed with patient.  Patient encouraged to perform monthly skin exams.  UV precautions reviewed with patient.  Skin care regimen reviewed with patient: Eliminate harsh soaps, i.e. Dial, zest, irsih spring; Mild soaps such as Cetaphil or Dove sensitive skin, avoid  hot or cold showers, aggressive use of emollients including vanicream, cetaphil or cerave discussed with patient.    Risks of non-melanoma skin cancer discussed with patient   Return to clinic 2-3 months.

## 2018-01-25 LAB — COPATH REPORT: NORMAL

## 2018-03-04 DIAGNOSIS — F51.01 PRIMARY INSOMNIA: ICD-10-CM

## 2018-03-04 DIAGNOSIS — G47.9 SLEEP DISTURBANCE: ICD-10-CM

## 2018-03-05 RX ORDER — ZOLPIDEM TARTRATE 5 MG/1
5 TABLET ORAL
Qty: 90 TABLET | Refills: 0 | Status: SHIPPED | OUTPATIENT
Start: 2018-03-05 | End: 2018-06-02

## 2018-03-05 NOTE — TELEPHONE ENCOUNTER
Ambien       Last Written Prescription Date:  10/12/2017  Last Fill Quantity: 90,   # refills: 0  Last Office Visit: 3  Future Office visit:  10/12/2017  Next 5 appointments (look out 90 days)     Mar 15, 2018  9:00 AM CDT   [facility] with Kim [facility]-C   [facility]    [address] [place] [address]   [phone]                   Routing refill request to provider for review/approval because:  Drug not on the [facility], [facility] or [facility] refill protocol or controlled substance

## 2018-06-02 DIAGNOSIS — F51.01 PRIMARY INSOMNIA: ICD-10-CM

## 2018-06-02 DIAGNOSIS — G47.9 SLEEP DISTURBANCE: ICD-10-CM

## 2018-06-04 RX ORDER — ZOLPIDEM TARTRATE 5 MG/1
5 TABLET ORAL
Qty: 90 TABLET | Refills: 0 | Status: SHIPPED | OUTPATIENT
Start: 2018-06-04 | End: 2018-08-25

## 2018-06-04 NOTE — TELEPHONE ENCOUNTER
ambien       Last Written Prescription Date:  3/5/2018  Last Fill Quantity: 90,   # refills: 0  Last Office Visit: 10/12/2017  Future Office visit:       Routing refill request to provider for review/approval because:  Drug not on the [facility], [facility] or [facility] refill protocol or controlled substance

## 2018-08-25 DIAGNOSIS — F51.01 PRIMARY INSOMNIA: ICD-10-CM

## 2018-08-25 DIAGNOSIS — G47.9 SLEEP DISTURBANCE: ICD-10-CM

## 2018-08-27 RX ORDER — ZOLPIDEM TARTRATE 5 MG/1
5 TABLET ORAL
Qty: 90 TABLET | Refills: 0 | Status: SHIPPED | OUTPATIENT
Start: 2018-08-27 | End: 2018-11-27

## 2018-11-20 ENCOUNTER — ALLIED HEALTH/NURSE VISIT (OUTPATIENT)
Dept: FAMILY MEDICINE | Facility: CLINIC | Age: 33
End: 2018-11-20
Payer: COMMERCIAL

## 2018-11-20 DIAGNOSIS — Z23 NEED FOR PROPHYLACTIC VACCINATION AND INOCULATION AGAINST INFLUENZA: Primary | ICD-10-CM

## 2018-11-20 PROCEDURE — 90686 IIV4 VACC NO PRSV 0.5 ML IM: CPT

## 2018-11-20 PROCEDURE — 99207 ZZC NO CHARGE NURSE ONLY: CPT

## 2018-11-20 PROCEDURE — 90471 IMMUNIZATION ADMIN: CPT

## 2018-11-20 NOTE — MR AVS SNAPSHOT
After Visit Summary   11/20/2018    Radha Etienne    MRN: [id number]           Patient Information     Date Of Birth          1985        Visit Information        Provider Department      11/20/2018 4:30 PM MICHAEL IVORY MA [facility]        Today's Diagnoses     Need for prophylactic vaccination and inoculation against influenza    -  1       Follow-ups after your visit        Your next 10 appointments already scheduled     Nov 27, 2018  7:30 AM CST   PHYSICAL with NIHARIKA Russell CNP   [facility]    [address] [place] [address] [phone]              Who to contact     If you have questions or need follow up information about today's clinic visit or your schedule please contact [facility] directly at [phone].  Normal or non-critical lab and imaging results will be communicated to you by MyChart, letter or phone within 4 business days after the clinic has received the results. If you do not hear from us within 7 days, please contact the clinic through MyChart or phone. If you have a critical or abnormal lab result, we will notify you by phone as soon as possible.  Submit refill requests through [facility] or call your pharmacy and they will forward the refill request to us. Please allow 3 business days for your refill to be completed.          Additional Information About Your Visit        MyChart Information     [facility] gives you secure access to your electronic health record. If you see a primary care provider, you can also send messages to your care team and make appointments. If you have questions, please call your primary care clinic.  If you do not have a primary care provider, please call [phone] and they will assist you.        Care EveryWhere ID     This is your Care EveryWhere ID. This could be used by other organizations to access your [facility]  records  [id number]         Blood Pressure from Last 3 Encounters:   01/18/18 126/81   10/12/17 122/82   10/27/16 110/60    Weight from Last 3 Encounters:   10/12/17 229 lb (103.9 kg)   10/27/16 256 lb 4 oz (116.2 kg)   10/05/16 248 lb (112.5 kg)              We Performed the Following     FLU VACCINE, SPLIT VIRUS, IM (QUADRIVALENT) [53864]- >3 YRS     Vaccine Administration, Initial [16743]        Primary Care Provider Fax #    Physician No Ref-Primary [phone]       No address on file        Equal Access to Services     CHANCE YEN : Hadclifton Yost, cherry joseph, magdalena miguel, jordan lala . So [facility] [phone].    ATENCIÓN: Si habla español, tiene a nassar disposición servicios gratuitos de asistencia lingüística. [facility] [phone].    We comply with applicable federal civil rights laws and [place] laws. We do not discriminate on the basis of race, color, national origin, age, disability, sex, sexual orientation, or gender identity.            Thank you!     Thank you for choosing [facility]  for your care. Our goal is always to provide you with excellent care. Hearing back from our patients is one way we can continue to improve our services. Please take a few minutes to complete the written survey that you may receive in the mail after your visit with us. Thank you!             Your Updated Medication List - Protect others around you: Learn how to safely use, store and throw away your medicines at www.disposemymeds.org.          This list is accurate as of 11/20/18  5:11 PM.  Always use your most recent med list.                   Brand Name Dispense Instructions for use Diagnosis    augmented betamethasone dipropionate 0.05 % cream    DIPROLENE-AF    50 g    APPLY TOPICALLY TO RASH TWO TIMES A DAY    Rash       doxycycline monohydrate 100 MG capsule    MONDOXYNE NL    30 capsule    Take 1 capsule (100 mg) by mouth daily with  food    Acne rosacea       fluticasone 50 MCG/ACT spray    FLONASE     Spray 1 spray into both nostrils daily        IBUPROFEN PO           levonorgestrel 20 MCG/24HR IUD    MIRENA    1 each    1 each (20 mcg) by Intrauterine route once for 1 dose    Intrauterine device surveillance       MELATONIN PO           metroNIDAZOLE 0.75 % cream    METROCREAM    45 g    Apply twice daily to face    Acne rosacea       sulfacetamide sodium-sulfur 10-5 % Emul     227 g    Use to wash face twice daily    Acne rosacea       TYLENOL PO           zolpidem 5 MG tablet    AMBIEN    90 tablet    Take 1 tablet (5 mg) by mouth nightly as needed for sleep    Primary insomnia, Sleep disturbance

## 2018-11-20 NOTE — PROGRESS NOTES

## 2018-11-22 NOTE — TELEPHONE ENCOUNTER
Script brought to [facility] pharmacy.  Marquita Ramires MA      Gout, unspecified cause, unspecified chronicity, unspecified site

## 2018-11-27 ENCOUNTER — OFFICE VISIT (OUTPATIENT)
Dept: FAMILY MEDICINE | Facility: CLINIC | Age: 33
End: 2018-11-27
Payer: COMMERCIAL

## 2018-11-27 VITALS
TEMPERATURE: 97.5 F | HEART RATE: 89 BPM | RESPIRATION RATE: 22 BRPM | SYSTOLIC BLOOD PRESSURE: 132 MMHG | OXYGEN SATURATION: 99 % | BODY MASS INDEX: 30.96 KG/M2 | WEIGHT: 204.3 LBS | HEIGHT: 68 IN | DIASTOLIC BLOOD PRESSURE: 80 MMHG

## 2018-11-27 DIAGNOSIS — L30.9 ECZEMA, UNSPECIFIED TYPE: ICD-10-CM

## 2018-11-27 DIAGNOSIS — F51.01 PRIMARY INSOMNIA: ICD-10-CM

## 2018-11-27 DIAGNOSIS — Z00.00 WELL ADULT EXAM: Primary | ICD-10-CM

## 2018-11-27 PROCEDURE — 99395 PREV VISIT EST AGE 18-39: CPT | Performed by: NURSE PRACTITIONER

## 2018-11-27 RX ORDER — ZOLPIDEM TARTRATE 5 MG/1
5 TABLET ORAL
Qty: 90 TABLET | Refills: 0 | Status: SHIPPED | OUTPATIENT
Start: 2018-11-27 | End: 2019-02-26

## 2018-11-27 RX ORDER — BETAMETHASONE DIPROPIONATE 0.5 MG/G
CREAM TOPICAL
Qty: 50 G | Refills: 0 | Status: SHIPPED | OUTPATIENT
Start: 2018-11-27 | End: 2021-08-06

## 2018-11-27 ASSESSMENT — ENCOUNTER SYMPTOMS
WEAKNESS: 0
PARESTHESIAS: 0
HEADACHES: 0
JOINT SWELLING: 0
DIARRHEA: 0
ARTHRALGIAS: 0
HEMATOCHEZIA: 0
EYE PAIN: 0
BREAST MASS: 0
SORE THROAT: 0
DYSURIA: 0
PALPITATIONS: 0
CONSTIPATION: 0
HEMATURIA: 0
COUGH: 0
HEARTBURN: 0
ABDOMINAL PAIN: 0
NERVOUS/ANXIOUS: 0
MYALGIAS: 0
SHORTNESS OF BREATH: 0
NAUSEA: 0
FREQUENCY: 0
FEVER: 0
DIZZINESS: 0
CHILLS: 0

## 2018-11-27 ASSESSMENT — PAIN SCALES - GENERAL: PAINLEVEL: NO PAIN (0)

## 2018-11-27 NOTE — MR AVS SNAPSHOT
After Visit Summary   11/27/2018    Radha Etienne    MRN: [id number]           Patient Information     Date Of Birth          1985        Visit Information        Provider Department      11/27/2018 7:30 AM Alyson Ramos APRN [facility]        Today's Diagnoses     Well adult exam    -  1    Primary insomnia        Eczema, unspecified type          Care Instructions      Preventive Health Recommendations  Female Ages 26 - 39  Yearly exam:   See your health care provider every year in order to    Review health changes.     Discuss preventive care.      Review your medicines if you your doctor has prescribed any.    Until age 30: Get a Pap test every three years (more often if you have had an abnormal result).    After age 30: Talk to your doctor about whether you should have a Pap test every 3 years or have a Pap test with HPV screening every 5 years.   You do not need a Pap test if your uterus was removed (hysterectomy) and you have not had cancer.  You should be tested each year for STDs (sexually transmitted diseases), if you're at risk.   Talk to your provider about how often to have your cholesterol checked.  If you are at risk for diabetes, you should have a diabetes test (fasting glucose).  Shots: Get a flu shot each year. Get a tetanus shot every 10 years.   Nutrition:     Eat at least 5 servings of fruits and vegetables each day.    Eat whole-grain bread, whole-wheat pasta and brown rice instead of white grains and rice.    Get adequate Calcium and Vitamin D.     Lifestyle    Exercise at least 150 minutes a week (30 minutes a day, 5 days of the week). This will help you control your weight and prevent disease.    Limit alcohol to one drink per day.    No smoking.     Wear sunscreen to prevent skin cancer.    See your dentist every six months for an exam and cleaning.            Follow-ups after your visit        Follow-up notes from your care team     Return  "in about 1 year (around 11/27/2019) for Physical Exam.      Who to contact     If you have questions or need follow up information about today's clinic visit or your schedule please contact [facility] directly at [phone].  Normal or non-critical lab and imaging results will be communicated to you by MyChart, letter or phone within 4 business days after the clinic has received the results. If you do not hear from us within 7 days, please contact the clinic through [employer] or phone. If you have a critical or abnormal lab result, we will notify you by phone as soon as possible.  Submit refill requests through [employer] or call your pharmacy and they will forward the refill request to us. Please allow 3 business days for your refill to be completed.          Additional Information About Your Visit        [employer] Information     [employer] gives you secure access to your electronic health record. If you see a primary care provider, you can also send messages to your care team and make appointments. If you have questions, please call your primary care clinic.  If you do not have a primary care provider, please call [phone] and they will assist you.        Care EveryWhere ID     This is your Care EveryWhere ID. This could be used by other organizations to access your [facility] medical records  [id number]        Your Vitals Were     Pulse Temperature Respirations Height Pulse Oximetry BMI (Body Mass Index)    89 97.5  F (36.4  C) (Temporal) 22 5' 7.75\" (1.721 m) 99% 31.29 kg/m2       Blood Pressure from Last 3 Encounters:   11/27/18 132/80   01/18/18 126/81   10/12/17 122/82    Weight from Last 3 Encounters:   11/27/18 204 lb 4.8 oz (92.7 kg)   10/12/17 229 lb (103.9 kg)   10/27/16 256 lb 4 oz (116.2 kg)              Today, you had the following     No orders found for display         Where to get your medicines      These medications were sent to [facility], [place] - [address]  [address], [facility] [address]     Phone:  [phone]     augmented betamethasone dipropionate 0.05 % cream         Some of these will need a paper prescription and others can be bought over the counter.  Ask your nurse if you have questions.     Bring a paper prescription for each of these medications     zolpidem 5 MG tablet          Primary Care Provider Fax #    Physician No Ref-Primary [phone]       No address on file        Equal Access to Services     BARBARA YEN : Hadii tj ku hadasho Soomaali, waaxda luqadaha, qaybta kaalmada adeegyada, waxay genaroin nessa lala . So [facility] [phone].    ATENCIÓN: Si rosalee hickman, tiene a nassar disposición servicios gratuitos de asistencia lingüística. [facility] [phone].    We comply with applicable federal civil rights laws and [place] laws. We do not discriminate on the basis of race, color, national origin, age, disability, sex, sexual orientation, or gender identity.            Thank you!     Thank you for choosing [facility]  for your care. Our goal is always to provide you with excellent care. Hearing back from our patients is one way we can continue to improve our services. Please take a few minutes to complete the written survey that you may receive in the mail after your visit with us. Thank you!             Your Updated Medication List - Protect others around you: Learn how to safely use, store and throw away your medicines at www.disposemymeds.org.          This list is accurate as of 11/27/18  9:04 AM.  Always use your most recent med list.                   Brand Name Dispense Instructions for use Diagnosis    augmented betamethasone dipropionate 0.05 % cream    DIPROLENE-AF    50 g    APPLY TOPICALLY TO RASH TWO TIMES A DAY    Eczema, unspecified type       IBUPROFEN PO           levonorgestrel 20 MCG/24HR IUD    MIRENA    1 each    1 each (20 mcg) by Intrauterine route once for 1 dose     Intrauterine device surveillance       MELATONIN PO           TYLENOL PO           zolpidem 5 MG tablet    AMBIEN    90 tablet    Take 1 tablet (5 mg) by mouth nightly as needed for sleep    Primary insomnia

## 2018-11-27 NOTE — PROGRESS NOTES
SUBJECTIVE:   CC: Radha Etienne is an 33 year old woman who presents for preventive health visit.     Physical   Annual:     Getting at least 3 servings of Calcium per day:  Yes    Bi-annual eye exam:  NO    Dental care twice a year:  Yes    Sleep apnea or symptoms of sleep apnea:  Daytime drowsiness    Diet:  Regular (no restrictions)    Frequency of exercise:  4-5 days/week    Duration of exercise:  30-45 minutes    Taking medications regularly:  Yes    Medication side effects:  None    Additional concerns today:  Yes    PHQ-2 Total Score: 0              Today's PHQ-2 Score:   PHQ-2 ( 1999 [employer]) 11/27/2018   Q1: Little interest or pleasure in doing things 0   Q2: Feeling down, depressed or hopeless 0   PHQ-2 Score 0   Q1: Little interest or pleasure in doing things Not at all   Q2: Feeling down, depressed or hopeless Not at all   PHQ-2 Score 0       Abuse: Current or Past(Physical, Sexual or Emotional)- No  Do you feel safe in your environment? Yes    Social History   Substance Use Topics     Smoking status: Never Smoker     Smokeless tobacco: Never Used     Alcohol use Yes      Comment: occassionally when not pregnant.     Alcohol Use 11/27/2018   If you drink alcohol do you typically have greater than 3 drinks per day OR greater than 7 drinks per week? No       Reviewed orders with patient.  Reviewed health maintenance and updated orders accordingly - Yes  BP Readings from Last 3 Encounters:   11/27/18 132/80   01/18/18 126/81   10/12/17 122/82    Wt Readings from Last 3 Encounters:   11/27/18 204 lb 4.8 oz (92.7 kg)   10/12/17 229 lb (103.9 kg)   10/27/16 256 lb 4 oz (116.2 kg)                    Mammogram not appropriate for this patient based on age.    Pertinent mammograms are reviewed under the imaging tab.  History of abnormal Pap smear: NO - age 30-65 PAP every 5 years with negative HPV co-testing recommended  PAP / HPV Latest Ref Rng & Units 9/29/2016 11/13/2013 1/24/2011   PAP - NIL NIL NIL    HPV 16 DNA NEG Negative - -   HPV 18 DNA NEG Negative - -   OTHER HR HPV NEG Negative - -     Reviewed and updated as needed this visit by clinical staff  Tobacco  Allergies  Meds  Med Hx  Surg Hx  Fam Hx  Soc Hx        Reviewed and updated as needed this visit by Provider        Past Medical History:   Diagnosis Date     Eczema      Encounter for IUD insertion 3/17/2015    Mirena IUD placed.      Past Surgical History:   Procedure Laterality Date     HC TOOTH EXTRACTION W/FORCEP      wisdom teeth     Obstetric History       T1      L1     SAB0   TAB0   Ectopic0   Multiple0   Live Births2       # Outcome Date GA Lbr Kush/2nd Weight Sex Delivery Anes PTL Lv   2  14 35w5d  7 lb 2 oz (3.232 kg) M Vag-Spont EPI N LIVE BIRTH      Name: Dexter Caballero Term 11 39w6d  7 lb 5.3 oz (3.325 kg) F Vag-Vacuum EPI N JOSÉ LUIS      Name: CAMERON BAILON      Apgar1:  8                Apgar5: 9          Review of Systems   Constitutional: Negative for chills and fever.   HENT: Negative for congestion, ear pain, hearing loss and sore throat.    Eyes: Negative for pain and visual disturbance.   Respiratory: Negative for cough and shortness of breath.    Cardiovascular: Negative for chest pain, palpitations and peripheral edema.   Gastrointestinal: Negative for abdominal pain, constipation, diarrhea, heartburn, hematochezia and nausea.   Breasts:  Negative for tenderness, breast mass and discharge.   Genitourinary: Negative for dysuria, frequency, genital sores, hematuria, pelvic pain, urgency, vaginal bleeding and vaginal discharge.   Musculoskeletal: Negative for arthralgias, joint swelling and myalgias.   Skin: Negative for rash.   Neurological: Negative for dizziness, weakness, headaches and paresthesias.   Psychiatric/Behavioral: Negative for mood changes. The patient is not nervous/anxious.      CONSTITUTIONAL: NEGATIVE for fever, chills, change in weight  INTEGUMENTARU/SKIN: NEGATIVE for  "worrisome rashes, moles or lesions  EYES: NEGATIVE for vision changes or irritation  ENT: NEGATIVE for ear, mouth and throat problems  RESP: NEGATIVE for significant cough or SOB  BREAST: NEGATIVE for masses, tenderness or discharge  CV: NEGATIVE for chest pain, palpitations or peripheral edema  GI: NEGATIVE for nausea, abdominal pain, heartburn, or change in bowel habits  : NEGATIVE for unusual urinary or vaginal symptoms. Periods are regular.  MUSCULOSKELETAL: NEGATIVE for significant arthralgias or myalgia  NEURO: NEGATIVE for weakness, dizziness or paresthesias  ENDOCRINE: NEGATIVE for temperature intolerance, skin/hair changes  PSYCHIATRIC: NEGATIVE for changes in mood or affect     OBJECTIVE:   /80  Pulse 89  Temp 97.5  F (36.4  C) (Temporal)  Resp 22  Ht 5' 7.75\" (1.721 m)  Wt 204 lb 4.8 oz (92.7 kg)  SpO2 99%  BMI 31.29 kg/m2  Physical Exam  GENERAL: healthy, alert and no distress  EYES: Eyes grossly normal to inspection, PERRL and conjunctivae and sclerae normal  HENT: ear canals and TM's normal, nose and mouth without ulcers or lesions  NECK: no adenopathy, no asymmetry, masses, or scars and thyroid normal to palpation  RESP: lungs clear to auscultation - no rales, rhonchi or wheezes  BREAST: normal without masses, tenderness or nipple discharge and no palpable axillary masses or adenopathy  CV: regular rate and rhythm, normal S1 S2, no S3 or S4, no murmur, click or rub, no peripheral edema and peripheral pulses strong  ABDOMEN: soft, nontender, no hepatosplenomegaly, no masses and bowel sounds normal  MS: no gross musculoskeletal defects noted, no edema  SKIN: no suspicious lesions or rashes  NEURO: Normal strength and tone, mentation intact and speech normal  PSYCH: mentation appears normal, affect normal/bright        ASSESSMENT/PLAN:       ICD-10-CM    1. Well adult exam Z00.00    2. Primary insomnia F51.01 zolpidem (AMBIEN) 5 MG tablet   3. Eczema, unspecified type L30.9 augmented " "betamethasone dipropionate (DIPROLENE-AF) 0.05 % cream       COUNSELING:  Reviewed preventive health counseling, as reflected in patient instructions       Regular exercise       Healthy diet/nutrition       Vision screening       Osteoporosis Prevention/Bone Health    BP Readings from Last 1 Encounters:   11/27/18 132/80     Estimated body mass index is 31.29 kg/(m^2) as calculated from the following:    Height as of this encounter: 5' 7.75\" (1.721 m).    Weight as of this encounter: 204 lb 4.8 oz (92.7 kg).    BP Screening:   Last 3 BP Readings:    BP Readings from Last 3 Encounters:   11/27/18 132/80   01/18/18 126/81   10/12/17 122/82       The following was recommended to the patient:  Re-screen BP within a year and recommended lifestyle modifications  Weight management plan: Discussed healthy diet and exercise guidelines     reports that she has never smoked. She has never used smokeless tobacco.      Counseling Resources:  ATP IV Guidelines  Pooled Cohorts Equation Calculator  Breast Cancer Risk Calculator  FRAX Risk Assessment  ICSI Preventive Guidelines  Dietary Guidelines for Americans, 2010  USDA's MyPlate  ASA Prophylaxis  Lung CA Screening    NIHARIKA Russell CNP  [facility]  "

## 2019-02-26 DIAGNOSIS — F51.01 PRIMARY INSOMNIA: ICD-10-CM

## 2019-02-26 NOTE — TELEPHONE ENCOUNTER
zolpiden  Last Written Prescription Date:  11/27/2018  Last Fill Quantity: 90,  # refills: 0   Last office visit: 11/27/2018 with prescribing provider:      Future Office Visit:      Requested Prescriptions   Pending Prescriptions Disp Refills     zolpidem (AMBIEN) 5 MG tablet 90 tablet 0     Sig: Take 1 tablet (5 mg) by mouth nightly as needed for sleep    There is no refill protocol information for this order          Routing refill request to provider for review/approval because:  Drug not on the [facility] refill protocol           Jocelin Sullivan RN on 2/26/2019 at 5:42 PM

## 2019-02-27 RX ORDER — ZOLPIDEM TARTRATE 5 MG/1
5 TABLET ORAL
Qty: 90 TABLET | Refills: 0 | Status: SHIPPED | OUTPATIENT
Start: 2019-02-27 | End: 2019-05-29

## 2019-08-26 DIAGNOSIS — F51.01 PRIMARY INSOMNIA: ICD-10-CM

## 2019-08-26 NOTE — TELEPHONE ENCOUNTER
Zolpidem 5 MG       Last Written Prescription Date:  5/31/19  Last Fill Quantity: 90,   # refills: 0  Last Office Visit: 11/27/18  Future Office visit:       Routing refill request to provider for review/approval because:  Drug not on the [facility], [facility] or [facility] refill protocol or controlled substance

## 2019-08-27 RX ORDER — ZOLPIDEM TARTRATE 5 MG/1
5 TABLET ORAL
Qty: 30 TABLET | Refills: 0 | Status: SHIPPED | OUTPATIENT
Start: 2019-08-27 | End: 2019-09-25

## 2019-08-27 NOTE — TELEPHONE ENCOUNTER
Called Radha and relayed message regarding refill and needing an appointment to sign controlled substances agreement.    Radha asks if Alyson or her nurse would call her back about the appointment that is needed to sign controlled substance agreement.

## 2019-08-27 NOTE — TELEPHONE ENCOUNTER
Please let the patient know she needs to make an appointment to come in and sign in agreement for management of controlled substances.

## 2019-08-27 NOTE — TELEPHONE ENCOUNTER
GLORIA on id vm informing patient to make appointment to discuss and sign controlled substance agreement. This can be a 15 min appointment with Alyson. Patient also informed that current refill has been ok'd for 30 days. Rosanna/MA

## 2019-09-05 ENCOUNTER — OFFICE VISIT (OUTPATIENT)
Dept: FAMILY MEDICINE | Facility: CLINIC | Age: 34
End: 2019-09-05
Payer: COMMERCIAL

## 2019-09-05 VITALS
HEART RATE: 100 BPM | WEIGHT: 203.4 LBS | TEMPERATURE: 97.3 F | RESPIRATION RATE: 22 BRPM | HEIGHT: 68 IN | OXYGEN SATURATION: 99 % | BODY MASS INDEX: 30.83 KG/M2 | DIASTOLIC BLOOD PRESSURE: 86 MMHG | SYSTOLIC BLOOD PRESSURE: 136 MMHG

## 2019-09-05 DIAGNOSIS — Z79.899 CONTROLLED SUBSTANCE AGREEMENT SIGNED: Primary | ICD-10-CM

## 2019-09-05 PROCEDURE — 99207 ZZC NO BILLABLE SERVICE THIS VISIT: CPT | Performed by: NURSE PRACTITIONER

## 2019-09-05 ASSESSMENT — MIFFLIN-ST. JEOR: SCORE: 1676.12

## 2019-09-05 ASSESSMENT — PAIN SCALES - GENERAL: PAINLEVEL: NO PAIN (0)

## 2019-09-05 NOTE — PROGRESS NOTES
Subjective     Radha Etienne is a 34 year old female who presents to clinic today for the following health issues:    HPI   Medication Followup of Ambien    Taking Medication as prescribed: yes    Side Effects:  None    Medication Helping Symptoms:  yes     The patient is here simply to sign a controlled substance agreement.  She takes Ambien 5 mg at bedtime for sleep.  She works in law enforcement and works overnights.  She has been taking his medication for a number of years.  She has no adverse side effects.  She has never abused her prescription.  This is a no charge visit        Assessment & Plan       ICD-10-CM    1. Controlled substance agreement signed Z79.899         Return in about 2 months (around 11/5/2019) for Physical Exam.    NIHARIKA Russell [facility]

## 2019-09-05 NOTE — LETTER
[facility]  09/05/19    Patient: Radha Etienne  YOB: 1985  Medical Record Number: [id number]  CSN: [id number]                                                                              Non-opioid Controlled Substance Agreement    I understand that my care provider has prescribed a controlled substance to help manage my condition(s). I am taking this medicine to help me function or work. I know this is strong medicine, and that it can cause serious side effects. Controlled substances can be sedating, addicting and may cause a dependency on the drug. They can affect my ability to drive or think, and cause depression. They need to be taken exactly as prescribed. Combining controlled substances with certain medicines or chemicals (such as cocaine, sedatives and tranquilizers, sleeping pills, meth) can be dangerous or even fatal. Also, if I stop controlled substances suddenly, I may have severe withdrawal symptoms.  If not helpful, I may be asked to stop them.    The risks, benefits, and side effects of these medicine(s) were explained to me. I agree that:    1. I will take part in other treatments as advised by my care team. This may be psychiatry or counseling, physical therapy, behavioral therapy, group treatment or a referral to a pain clinic. I will reduce or stop my medicine when my care team tells me to do so.  2. I will take my medicines as prescribed. I will not change the dose or schedule unless my care team tells me to. There will be no refills if I  run out early.   I may be contactedwithout warning and asked to complete a urine drug test or pill count at any time.   3. I will keep all my appointments, and understand this is part of the monitoring of controlled substances. My care team may require an office visit for EVERY controlled substance refill. If I miss appointments or don t follow instructions, my care team may stop my medicine.  4. I will not ask  other providers to prescribe controlled substances, and I will not accept controlled substances from other people. If I need another prescribed controlled substance for a new reason, I will tell my care team within 1 business day.  5. I will use one pharmacy to fill all of my controlled substance prescriptions, and it is up to me to make sure that I do not run out of my medicines on weekends or holidays. If my care team is willing to refill my controlled substance prescription without a visit, I must request refills only during office hours, refills may take up to 3 days to process, and it may take up to 5 to 7 days for my medicine to be mailed and ready at my pharmacy. Prescriptions will not be mailed anywhere except my pharmacy.    6. I am responsible for my prescriptions. If the medicine/prescription is lost or stolen, it will not be replaced. I also agree not to share controlled substance medicines with anyone.              [facility]  09/05/19  Patient:  Radha Etienne  YOB: 1985  Medical Record Number: [id number]  [facility]: [id number]    7. I agree to not use ANY illegal or recreational drugs. This includes marijuana, cocaine, bath salts or other drugs. I agree not to use alcohol unless my care team says I may. I agree to give urine samples whenever asked. If I don t give a urine sample, the care team may stop my medicine.    8. If I enroll in the [place] Medical Marijuana program, I will tell my care team. I will also sign an agreement to share my medical records with my care team.    9. I will bring in my list of medicines (or my medicine bottles) each time I come to the clinic.   10. I will tell my care team right away if I become pregnant or have a new medical problem treated outside of my regular clinic.  11. I understand that this medicine can affect my thinking and judgment. It may be unsafe for me to drive, use machinery and do dangerous tasks. I will not do  any of these things until I know how the medicine affects me. If my dose changes, I will wait to see how it affects me. I will contact my care team if I have concerns about medicine side effects.    I understand that if I do not follow any of the conditions above, my prescriptions or treatment may be stopped.      I agree that my provider, clinic care team, and pharmacy may work with any city, state or federal law enforcement agency that investigates the misuse, sale, or other diversion of my controlled medicine. I will allow my provider to discuss my care with or share a copy of this agreement with any other treating provider, pharmacy or emergency room where I receive care. I agree to give up (waive) any right of privacy or confidentiality with respect to these consents.   I have read this agreement and have asked questions about anything I did not understand.    ____________________________________________________    ____________  ________  Patient signature                                                         Date      Time    ____________________________________________________     ____________  ________  Witness                                                          Date      Time    ____________________________________________________  Provider signature

## 2019-09-25 DIAGNOSIS — F51.01 PRIMARY INSOMNIA: ICD-10-CM

## 2019-09-26 RX ORDER — ZOLPIDEM TARTRATE 5 MG/1
5 TABLET ORAL
Qty: 30 TABLET | Refills: 3 | Status: SHIPPED | OUTPATIENT
Start: 2019-09-26 | End: 2020-01-16

## 2019-09-26 NOTE — TELEPHONE ENCOUNTER
Zolpidem 5 MG       Last Written Prescription Date:  8/27/19  Last Fill Quantity: 30,   # refills: 0  Last Office Visit: 9-5-19  Future Office visit:    Next 5 appointments (look out 90 days)    Oct 08, 2019 10:30 AM CDT  Office Visit with Rodrigo Waldron MD  [facility] ([facility]) [address] [place] [id number] [phone]           Routing refill request to provider for review/approval because:  Drug not on the [facility], [facility] or [facility] refill protocol or controlled substance

## 2019-10-07 ENCOUNTER — IMMUNIZATION (OUTPATIENT)
Dept: FAMILY MEDICINE | Facility: CLINIC | Age: 34
End: 2019-10-07
Payer: COMMERCIAL

## 2019-10-07 PROCEDURE — 90471 IMMUNIZATION ADMIN: CPT

## 2019-10-07 PROCEDURE — 90686 IIV4 VACC NO PRSV 0.5 ML IM: CPT

## 2019-10-08 ENCOUNTER — OFFICE VISIT (OUTPATIENT)
Dept: FAMILY MEDICINE | Facility: CLINIC | Age: 34
End: 2019-10-08
Payer: COMMERCIAL

## 2019-10-08 VITALS
OXYGEN SATURATION: 99 % | HEART RATE: 72 BPM | TEMPERATURE: 97.8 F | BODY MASS INDEX: 31.52 KG/M2 | SYSTOLIC BLOOD PRESSURE: 110 MMHG | RESPIRATION RATE: 14 BRPM | WEIGHT: 207.3 LBS | DIASTOLIC BLOOD PRESSURE: 68 MMHG

## 2019-10-08 DIAGNOSIS — Z30.2 ENCOUNTER FOR STERILIZATION: Primary | ICD-10-CM

## 2019-10-08 PROCEDURE — 99214 OFFICE O/P EST MOD 30 MIN: CPT | Performed by: OBSTETRICS & GYNECOLOGY

## 2019-10-08 ASSESSMENT — PAIN SCALES - GENERAL: PAINLEVEL: NO PAIN (0)

## 2019-10-08 NOTE — PROGRESS NOTES
Subjective: She is 34 years old  2 para 2.  She is wondering about a laparoscopic bilateral salpingectomy.  Her  has had a vasectomy but he has never had it checked out.  It was done a year ago.  She wants to be very sure she cannot get pregnant again.  Also she wants to lower her risk of ovarian cancer.  There is some significant cancer risk in her family.  Is absolutely sure about her decision not to have any more children.  She is worried that the vasectomy will not be sufficient.      The past medical history, social history, past surgical history and family history as shown below have been reviewed by me today.    Past Medical History:   Diagnosis Date     Eczema      Encounter for IUD insertion 3/17/2015    Mirena IUD placed.        Allergies   Allergen Reactions     Nkda [No Known Drug Allergies]      Tape [Adhesive Tape]      Current Outpatient Medications   Medication Sig Dispense Refill     Acetaminophen (TYLENOL PO)        augmented betamethasone dipropionate (DIPROLENE-AF) 0.05 % cream APPLY TOPICALLY TO RASH TWO TIMES A DAY 50 g 0     IBUPROFEN PO        levonorgestrel (MIRENA) 20 MCG/24HR IUD 1 each (20 mcg) by Intrauterine route once for 1 dose 1 each 0     MELATONIN PO        zolpidem (AMBIEN) 5 MG tablet Take 1 tablet (5 mg) by mouth nightly as needed for sleep 30 tablet 3     Past Surgical History:   Procedure Laterality Date     HC TOOTH EXTRACTION W/FORCEP      wisdom teeth     Social History     Socioeconomic History     Marital status:      Spouse name: Galen     Number of children: 1     Years of education: 16     Highest education level: None   Occupational History     Employer: [employer]   Social Needs     Financial resource strain: None     Food insecurity:     Worry: None     Inability: None     Transportation needs:     Medical: None     Non-medical: None   Tobacco Use     Smoking status: Never Smoker     Smokeless tobacco: Never Used    Substance and Sexual Activity     Alcohol use: Yes     Comment: occassionally when not pregnant.     Drug use: No     Sexual activity: Yes     Partners: Male   Lifestyle     Physical activity:     Days per week: None     Minutes per session: None     Stress: None   Relationships     Social connections:     Talks on phone: None     Gets together: None     Attends Sikhism service: None     Active member of club or organization: None     Attends meetings of clubs or organizations: None     Relationship status: None     Intimate partner violence:     Fear of current or ex partner: None     Emotionally abused: None     Physically abused: None     Forced sexual activity: None   Other Topics Concern      Service No     Blood Transfusions No     Caffeine Concern No     Comment: advised to limit to 200mg per day     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 works nights and needs help falling asleep     Stress Concern No     Weight Concern Yes     Comment: pt states she has always struggled with her weight     Special Diet No     Back Care Yes     Exercise Yes     Comment: tries to walk on treadmill and does pushups and situps.     Bike Helmet No     Seat Belt Yes     Self-Exams No     Parent/sibling w/ CABG, MI or angioplasty before 65F 55M? Not Asked   Social History Narrative     to Galen and lives in [place].  No smokers in the home.  Has indoor cats/discussed toxoplasmosis precautions.  No concerns about domestic violence.     Family History   Problem Relation Age of Onset     Obesity Mother      Breast Cancer Maternal Grandmother         60's     Cancer Maternal Grandmother         bladder and ovarian     Thyroid Disease Maternal Grandmother      Obesity Maternal Grandmother      Hypertension Maternal Grandfather      Heart Disease Maternal Grandfather         Bypass surgery     Lipids Maternal  Grandfather      Alcohol/Drug Maternal Grandfather         Recovered alcoholic     Hypertension Paternal Grandfather      Depression Brother         bi-polar     Asthma Brother         childhood       ROS: A 12 point review of systems was done. Except for what is listed above in the HPI, the systems review is negative .      Objective: Vital signs: Blood pressure 110/68, pulse 72, temperature 97.8  F (36.6  C), temperature source Temporal, resp. rate 14, weight 94 kg (207 lb 4.8 oz), SpO2 99 %, not currently breastfeeding.    The abdomen is soft and nontender.  I showed her where the incisions would be made and she is accepting of that.      Assessment/Plan: A total of 25 minutes were spent face-to-face with this patient during today's consultation, with more than 50% of that time devoted to conversation and counseling about the management decisions.      1. Sterilization: 34-year-old female  2 para 2 who wishes permanent sterilization.  Her  has had vasectomy but did never get evaluated after surgery.  She thinks he may still do this but she is very insecure about getting pregnant in the meantime and she is wanting something permanent.  Currently she has a Mirena IUD in place.  I gave her information to consider it.  I told her that the most logical solution would be to have her  get a semen analysis and check if the vasectomy was successful.  That way she could avoid general anesthesia which is more invasive.  Also the laparoscopy is more invasive than simply having his semen analysis done.  Nevertheless she is insistent that she wants surgery to be sure that she cannot conceive in light of the uncertainty of the vasectomy result.    I asked her to think about this carefully over the next several weeks and if she still wants to proceed with laparoscopic bilateral salpingectomy then we will schedule it.  She would like a second opinion we will refer her for that as well.             The above  information was dictating using Dragon voice software and as a result there may be some irregularities that were not detected in my review of this note.    ONIEL Waldron MD

## 2020-01-16 ENCOUNTER — OFFICE VISIT (OUTPATIENT)
Dept: FAMILY MEDICINE | Facility: CLINIC | Age: 35
End: 2020-01-16
Payer: COMMERCIAL

## 2020-01-16 VITALS
BODY MASS INDEX: 30.31 KG/M2 | HEIGHT: 68 IN | HEART RATE: 76 BPM | RESPIRATION RATE: 14 BRPM | OXYGEN SATURATION: 100 % | WEIGHT: 200 LBS | SYSTOLIC BLOOD PRESSURE: 124 MMHG | DIASTOLIC BLOOD PRESSURE: 64 MMHG | TEMPERATURE: 98.8 F

## 2020-01-16 DIAGNOSIS — Z00.01 ENCOUNTER FOR WELL ADULT EXAM WITH ABNORMAL FINDINGS: Primary | ICD-10-CM

## 2020-01-16 DIAGNOSIS — F51.01 PRIMARY INSOMNIA: ICD-10-CM

## 2020-01-16 PROCEDURE — 87624 HPV HI-RISK TYP POOLED RSLT: CPT | Performed by: OBSTETRICS & GYNECOLOGY

## 2020-01-16 PROCEDURE — 99395 PREV VISIT EST AGE 18-39: CPT | Mod: 25 | Performed by: OBSTETRICS & GYNECOLOGY

## 2020-01-16 PROCEDURE — G0145 SCR C/V CYTO,THINLAYER,RESCR: HCPCS | Performed by: OBSTETRICS & GYNECOLOGY

## 2020-01-16 PROCEDURE — 58301 REMOVE INTRAUTERINE DEVICE: CPT | Performed by: OBSTETRICS & GYNECOLOGY

## 2020-01-16 PROCEDURE — 99213 OFFICE O/P EST LOW 20 MIN: CPT | Mod: 25 | Performed by: OBSTETRICS & GYNECOLOGY

## 2020-01-16 RX ORDER — ZOLPIDEM TARTRATE 5 MG/1
5 TABLET ORAL
Qty: 30 TABLET | Refills: 2 | Status: SHIPPED | OUTPATIENT
Start: 2020-01-16 | End: 2023-10-12

## 2020-01-16 ASSESSMENT — MIFFLIN-ST. JEOR: SCORE: 1652.75

## 2020-01-16 ASSESSMENT — ENCOUNTER SYMPTOMS
NAUSEA: 0
EYE PAIN: 0
PARESTHESIAS: 0
HEADACHES: 0
ABDOMINAL PAIN: 0
HEARTBURN: 0
NERVOUS/ANXIOUS: 0
HEMATOCHEZIA: 0
SHORTNESS OF BREATH: 0
SORE THROAT: 0
WEAKNESS: 0
JOINT SWELLING: 0
HEMATURIA: 0
MYALGIAS: 0
BREAST MASS: 0
FEVER: 0
DIARRHEA: 0
DIZZINESS: 0
PALPITATIONS: 0
CONSTIPATION: 0
FREQUENCY: 0
ARTHRALGIAS: 0
CHILLS: 0
COUGH: 0
DYSURIA: 0

## 2020-01-16 ASSESSMENT — PAIN SCALES - GENERAL: PAINLEVEL: NO PAIN (0)

## 2020-01-16 NOTE — PROGRESS NOTES
SUBJECTIVE:   CC: Radha Etienne is an 34 year old woman who presents for preventive health visit.     Healthy Habits:     Getting at least 3 servings of Calcium per day:  Yes    Bi-annual eye exam:  NO    Dental care twice a year:  Yes    Sleep apnea or symptoms of sleep apnea:  Daytime drowsiness    Diet:  Regular (no restrictions)    Frequency of exercise:  4-5 days/week    Duration of exercise:  30-45 minutes    Taking medications regularly:  Yes    Medication side effects:  None    PHQ-2 Total Score: 0    Additional concerns today:  No      Today's PHQ-2 Score:   PHQ-2 ( 1999 Pfizer) 1/16/2020   Q1: Little interest or pleasure in doing things 0   Q2: Feeling down, depressed or hopeless 0   PHQ-2 Score 0   Q1: Little interest or pleasure in doing things Not at all   Q2: Feeling down, depressed or hopeless Not at all   PHQ-2 Score 0       Abuse: Current or Past(Physical, Sexual or Emotional)- No  Do you feel safe in your environment? Yes      Subjective:Radha is here for yearly physical. Current concerns are:      She wants her IUD removed.  Also she wants a refill of Ambien.  She has been using it nightly because she works as a  at night.  She uses it each morning to sleep during the day.        Past Medical History:   Diagnosis Date     Eczema      Encounter for IUD insertion 3/17/2015    Mirena IUD placed.      Current Outpatient Medications   Medication Sig Dispense Refill     Acetaminophen (TYLENOL PO)        augmented betamethasone dipropionate (DIPROLENE-AF) 0.05 % cream APPLY TOPICALLY TO RASH TWO TIMES A DAY 50 g 0     IBUPROFEN PO        levonorgestrel (MIRENA) 20 MCG/24HR IUD 1 each (20 mcg) by Intrauterine route once for 1 dose 1 each 0     MELATONIN PO        zolpidem (AMBIEN) 5 MG tablet Take 1 tablet (5 mg) by mouth nightly as needed for sleep 30 tablet 3      Allergies   Allergen Reactions     Nkda [No Known Drug Allergies]      Tape [Adhesive Tape]       History  "  Smoking Status     Never Smoker   Smokeless Tobacco     Never Used      Past Surgical History:   Procedure Laterality Date     HC TOOTH EXTRACTION W/FORCEP      wisdom teeth      Social History     Tobacco Use     Smoking status: Never Smoker     Smokeless tobacco: Never Used   Substance Use Topics     Alcohol use: Yes     Comment: occassionally when not pregnant.     Drug use: No      Family History   Problem Relation Age of Onset     Obesity Mother      Breast Cancer Maternal Grandmother         60's     Cancer Maternal Grandmother         bladder and ovarian     Thyroid Disease Maternal Grandmother      Obesity Maternal Grandmother      Hypertension Maternal Grandfather      Heart Disease Maternal Grandfather         Bypass surgery     Lipids Maternal Grandfather      Alcohol/Drug Maternal Grandfather         Recovered alcoholic     Hypertension Paternal Grandfather      Depression Brother         bi-polar     Asthma Brother         childhood       ROS: A 12 point review of systems is negative except for the following:        Physical Exam: Blood pressure 124/64, pulse 76, temperature 98.8  F (37.1  C), temperature source Temporal, resp. rate 14, height 1.715 m (5' 7.5\"), weight 90.7 kg (200 lb), SpO2 100 %, not currently breastfeeding.      HEENT:    Sclerae and conjunctiva are normal.   Ear canals and TMs look normal.  Nasal mucosa is pink  - no polyps or masses seen.  Throat is unremarkable . Mucous membranes are moist.   Neck is supple, mobile, no adenopathy or masses palpable. The thyroid feels normal.   Normal range of motion noted.  Chest is clear to auscultation.  No wheezes, rales or rhonchi heard.  Cardiac exam is normal with s1, s2, no murmurs or adventitious sounds.Normal rate and rhythm is heard.   Abdomen is soft,  nondistended, No masses felt.No HSM. No guarding or rigidity or rebound   noted. Palpation reveals  no    tenderness   Normal bowel sounds heard.   Pelvic exam:My nurse Amanda  was " present to chaperone the exam.  The external genitalia appeared normal.    The vaginal vault was without bleeding  or   discharge   or odor.   The cervix was smooth   and shiny and normal in appearance.    A pap was obtained.     No vaginal support defects were noted,    Bimanual exam revealed a 6 week  sized uterus. It does not   descend   well in the vaginal vault.    No adnexal masses were felt.    There was no  cervical motion tenderness.    Exam was MODERATELY   limited by the patient's body habitus.      The Mirena intrauterine device was removed in the usual fashion with a ring forceps.  There were no complications.      Breast exam:   An exam was performed in supine position.   My nurse was present as a chaperone.  No dominant masses were felt.   No asymmetry noted.   No nipple discharge noted.         Assessment/Plan:    The yearly exam is normal except she has mild obesity.  We did discuss exercise.  Advised daily walk.  Also she has insomnia.  Discussed daily use of Ambien.  This is not a good idea.  I advised her to wean off of it gradually over 3 weeks.  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  I will give her 30 tablets with 2 refills.  That should last her 6 months at least and very likely will last her the year.  We discussed the risks of daily use.  We also discussed contraceptive management.  Then Dr. Joseph came in and informed her that her 's semen analysis is negative so she will not need the birth control pill after all.      Additional Plan:Diet and rest and exercise discussed.      I have advised going for a 5 mile walk daily if possible       See labs and orders.    .Immunizations reviewed(TDAP, pneumovax, shingles vaccine,etc)and discussed-including advice for yearly flu shot/tetanus update.     The following vaccines given today:  declined    Hepatitis C and HIV testing  offered    these tests were declined.     Calcium supplementation advised           Labs done: Lipids several years ago so this is not repeated now.    I advised the following exams with specialists:    1. Dental evaluation yearly    2. Dermatology evaluation for mole exam yearly    3. Ophthalmology exam yearly to check for glaucoma, etc        Living will was discussed.         Followup in 12   months, sooner if concerns arise.   ONIEL Waldron MD(electronic signature)

## 2020-01-20 LAB
COPATH REPORT: NORMAL
PAP: NORMAL

## 2020-01-22 LAB
FINAL DIAGNOSIS: NORMAL
HPV HR 12 DNA CVX QL NAA+PROBE: NEGATIVE
HPV16 DNA SPEC QL NAA+PROBE: NEGATIVE
HPV18 DNA SPEC QL NAA+PROBE: NEGATIVE
SPECIMEN DESCRIPTION: NORMAL
SPECIMEN SOURCE CVX/VAG CYTO: NORMAL

## 2020-12-06 ENCOUNTER — HEALTH MAINTENANCE LETTER (OUTPATIENT)
Age: 35
End: 2020-12-06

## 2021-02-20 ENCOUNTER — HEALTH MAINTENANCE LETTER (OUTPATIENT)
Age: 36
End: 2021-02-20

## 2021-04-09 ENCOUNTER — E-VISIT (OUTPATIENT)
Dept: URGENT CARE | Facility: CLINIC | Age: 36
End: 2021-04-09
Payer: COMMERCIAL

## 2021-04-09 DIAGNOSIS — R05.9 COUGH: ICD-10-CM

## 2021-04-09 DIAGNOSIS — Z20.822 SUSPECTED COVID-19 VIRUS INFECTION: ICD-10-CM

## 2021-04-09 DIAGNOSIS — J02.9 SORE THROAT: ICD-10-CM

## 2021-04-09 DIAGNOSIS — R05.9 COUGH: Primary | ICD-10-CM

## 2021-04-09 LAB
DEPRECATED S PYO AG THROAT QL EIA: NEGATIVE
SPECIMEN SOURCE: NORMAL
SPECIMEN SOURCE: NORMAL
STREP GROUP A PCR: NOT DETECTED

## 2021-04-09 PROCEDURE — 99N1174 PR STATISTIC STREP A RAPID: Performed by: PHYSICIAN ASSISTANT

## 2021-04-09 PROCEDURE — U0005 INFEC AGEN DETEC AMPLI PROBE: HCPCS | Performed by: PHYSICIAN ASSISTANT

## 2021-04-09 PROCEDURE — 87651 STREP A DNA AMP PROBE: CPT | Performed by: PHYSICIAN ASSISTANT

## 2021-04-09 PROCEDURE — U0003 INFECTIOUS AGENT DETECTION BY NUCLEIC ACID (DNA OR RNA); SEVERE ACUTE RESPIRATORY SYNDROME CORONAVIRUS 2 (SARS-COV-2) (CORONAVIRUS DISEASE [COVID-19]), AMPLIFIED PROBE TECHNIQUE, MAKING USE OF HIGH THROUGHPUT TECHNOLOGIES AS DESCRIBED BY CMS-2020-01-R: HCPCS | Performed by: PHYSICIAN ASSISTANT

## 2021-04-09 PROCEDURE — 99421 OL DIG E/M SVC 5-10 MIN: CPT | Performed by: PHYSICIAN ASSISTANT

## 2021-04-09 NOTE — PATIENT INSTRUCTIONS
April 9, 2021  RE:  Radha Etienne                                                                                                                  [address] [place] [id number]      To whom it may concern:    I evaluated Radha Etienne on April 9, 2021. Radha Etienne should be excused from work/school.     They should let their workplace manager and staffing office know when their quarantine ends.    We can not give an exact date as it depends on the information below. They can calculate this on their own or work with their manager/staffing office to calculate this. (For example if they were exposed on 10/04, they would have to quarantine for 14 full days. That would be through 10/18. They could return on 10/19.)    Quarantine Guidelines:      If patient receives a positive COVID-19 test result, they should follow the guidance of those who are giving the results. Usually the return to work is 10 (or in some cases 20 days from symptom onset.) If they work at [employer], they must be cleared by Employee Occupational Health and Safety to return to work.        If patient receives a negative COVID-19 test result and did not have a high risk exposure to someone with a known positive COVID-19 test, they can return to work once they're free of fever for 24 hours without fever-reducing medication and their symptoms are improving or resolved.      If patient receives a negative COVID-19 test and if they had a high risk exposure to someone who has tested positive for COVID-19 then they can return to work 14 days after their last contact with the positive individual    Note: If there is ongoing exposure to the covid positive person, this quarantine period may be longer than 14 days. (For example, if they are continually exposed to their child, who tested positive and cannot isolate from them, then the quarantine of 7-14 days can't start until their child is no longer contagious. This is  typically 10 days from onset to the child's symptoms. So the total duration may be 17-24 days in this case.)     Sincerely,  Kimberli Brandt PA-C

## 2021-04-10 LAB
LABORATORY COMMENT REPORT: ABNORMAL
SARS-COV-2 RNA RESP QL NAA+PROBE: NORMAL
SARS-COV-2 RNA RESP QL NAA+PROBE: POSITIVE
SPECIMEN SOURCE: ABNORMAL
SPECIMEN SOURCE: NORMAL

## 2021-08-04 ENCOUNTER — MYC MEDICAL ADVICE (OUTPATIENT)
Dept: FAMILY MEDICINE | Facility: CLINIC | Age: 36
End: 2021-08-04

## 2021-08-04 DIAGNOSIS — L30.9 ECZEMA, UNSPECIFIED TYPE: ICD-10-CM

## 2021-08-06 RX ORDER — BETAMETHASONE DIPROPIONATE 0.5 MG/G
CREAM TOPICAL
Qty: 50 G | Refills: 0 | Status: SHIPPED | OUTPATIENT
Start: 2021-08-06 | End: 2023-10-02

## 2021-08-06 NOTE — TELEPHONE ENCOUNTER
I refilled the steroid cream which she is using for eczema.. She has a clinic appointment coming up soon to discuss this.ONIEL Waldron MD

## 2021-08-28 ASSESSMENT — ENCOUNTER SYMPTOMS
FREQUENCY: 0
DIARRHEA: 0
NERVOUS/ANXIOUS: 0
WEAKNESS: 0
CONSTIPATION: 0
SHORTNESS OF BREATH: 0
DIZZINESS: 0
HEMATOCHEZIA: 0
CHILLS: 0
ABDOMINAL PAIN: 0
FEVER: 0
HEADACHES: 0
SORE THROAT: 0
PALPITATIONS: 0
DYSURIA: 0
BREAST MASS: 0
HEARTBURN: 0
ARTHRALGIAS: 0
NAUSEA: 0
JOINT SWELLING: 0
PARESTHESIAS: 0
COUGH: 0
EYE PAIN: 0
MYALGIAS: 0
HEMATURIA: 0

## 2021-08-31 ENCOUNTER — OFFICE VISIT (OUTPATIENT)
Dept: FAMILY MEDICINE | Facility: CLINIC | Age: 36
End: 2021-08-31
Payer: COMMERCIAL

## 2021-08-31 VITALS
HEIGHT: 68 IN | OXYGEN SATURATION: 99 % | DIASTOLIC BLOOD PRESSURE: 70 MMHG | TEMPERATURE: 98.3 F | BODY MASS INDEX: 28.79 KG/M2 | WEIGHT: 190 LBS | SYSTOLIC BLOOD PRESSURE: 124 MMHG | HEART RATE: 88 BPM | RESPIRATION RATE: 16 BRPM

## 2021-08-31 DIAGNOSIS — Z01.419 WELL WOMAN EXAM: Primary | ICD-10-CM

## 2021-08-31 PROCEDURE — 99395 PREV VISIT EST AGE 18-39: CPT | Performed by: OBSTETRICS & GYNECOLOGY

## 2021-08-31 ASSESSMENT — MIFFLIN-ST. JEOR: SCORE: 1605.33

## 2021-08-31 ASSESSMENT — PAIN SCALES - GENERAL: PAINLEVEL: NO PAIN (0)

## 2021-08-31 NOTE — PROGRESS NOTES
Subjective:Radha is here for yearly physical. Current concerns are:  none        Past Medical History:   Diagnosis Date     Eczema      Encounter for IUD insertion 3/17/2015    Mirena IUD placed.      Current Outpatient Medications   Medication Sig Dispense Refill     Acetaminophen (TYLENOL PO)        augmented betamethasone dipropionate (DIPROLENE-AF) 0.05 % external cream APPLY TOPICALLY TO RASH TWO TIMES A DAY 50 g 0     IBUPROFEN PO        MELATONIN PO        norethindrone-ethinyl estradiol (ORTHO-NOVUM 1-35 TAB,NORTREL 1-35 TAB) 1-35 MG-MCG tablet Take 1 tablet by mouth daily 28 tablet 3     zolpidem (AMBIEN) 5 MG tablet Take 1 tablet (5 mg) by mouth nightly as needed for sleep 30 tablet 2      Allergies   Allergen Reactions     Nkda [No Known Drug Allergies]      Tape [Adhesive Tape]       History   Smoking Status     Never Smoker   Smokeless Tobacco     Never Used      Past Surgical History:   Procedure Laterality Date     HC TOOTH EXTRACTION W/FORCEP      wisdom teeth      Social History     Tobacco Use     Smoking status: Never Smoker     Smokeless tobacco: Never Used   Substance Use Topics     Alcohol use: Yes     Comment: occassionally when not pregnant.     Drug use: No      Family History   Problem Relation Age of Onset     Obesity Mother      Breast Cancer Maternal Grandmother         60's     Cancer Maternal Grandmother         bladder and ovarian     Thyroid Disease Maternal Grandmother      Obesity Maternal Grandmother      Hypertension Maternal Grandfather      Heart Disease Maternal Grandfather         Bypass surgery     Lipids Maternal Grandfather      Alcohol/Drug Maternal Grandfather         Recovered alcoholic     Hypertension Paternal Grandfather      Depression Brother         bi-polar     Asthma Brother         childhood       ROS: A 12 point review of systems is negative except for the following:  No issues      Physical Exam: /70   Pulse 88   Temp 98.3  F (36.8  C) (Temporal)   " Resp 16   Ht 1.727 m (5' 8\")   Wt 86.2 kg (190 lb)   LMP 08/13/2021   SpO2 99%   Breastfeeding No   BMI 28.89 kg/m    HEENT:    Sclerae and conjunctiva are normal.   Ear canals and TMs look normal.  Nasal mucosa is pink  - no polyps or masses seen.  Throat is unremarkable . Mucous membranes are moist.   Neck is supple, mobile, no adenopathy or masses palpable. The thyroid feels normal.   Normal range of motion noted.  Chest is clear to auscultation.  No wheezes, rales or rhonchi heard.  Cardiac exam is normal with s1, s2, no murmurs or adventitious sounds.Normal rate and rhythm is heard.   Abdomen is soft,  nondistended, No masses felt.No HSM. No guarding or rigidity or rebound   noted. Palpation reveals  no    tenderness   Normal bowel sounds heard.   Pelvic exam: She declined.  Her Pap was normal last year.        The breast exam was declined.      We did discuss the option for an exam   and I suggested that she should be doing a self-breast exam   monthly and after the age of 40 a yearly mammogram   and she feels comfortable that this is sufficient.             Assessment/Plan:    The yearly exam is normal       Additional Plan:Diet and rest and exercise discussed.      I have advised going for a 5 mile walk daily if possible       See labs and orders.    .Immunizations reviewed(TDAP, pneumovax, shingles vaccine,etc)and discussed-including advice for yearly flu shot/tetanus update.     The following vaccines given today:   She declined any.  She had Covid a few months ago.    Hepatitis C and HIV testing offered    these tests were declined.     Calcium supplementation advised     Colonoscopy at age 50      Mammogram  Is advised beginning at age 40-pt to be responsible for getting this done     DEXA is advised beginning at age 65      Labs done: see orders  She declined    I advised the following exams with specialists:    1. Dental evaluation yearly    2. Dermatology evaluation for mole exam yearly    3. " Ophthalmology exam yearly to check for glaucoma, etc          Living will was discussed.         Followup in 12   months, sooner if concerns arise.   ONIEL Waldron MD(electronic signature)  Answers for HPI/ROS submitted by the patient on 8/28/2021  Frequency of exercise:: 4-5 days/week  Getting at least 3 servings of Calcium per day:: Yes  Diet:: Regular (no restrictions)  Taking medications regularly:: Not Applicable  Medication side effects:: Not applicable  Bi-annual eye exam:: Yes  Dental care twice a year:: Yes  Sleep apnea or symptoms of sleep apnea:: Daytime drowsiness  abdominal pain: No  Blood in stool: No  Blood in urine: No  chest pain: No  chills: No  congestion: No  constipation: No  cough: No  diarrhea: No  dizziness: No  ear pain: No  eye pain: No  nervous/anxious: No  fever: No  frequency: No  genital sores: No  headaches: No  hearing loss: No  heartburn: No  arthralgias: No  joint swelling: No  peripheral edema: No  mood changes: No  myalgias: No  nausea: No  dysuria: No  palpitations: No  Skin sensation changes: No  sore throat: No  urgency: No  rash: No  shortness of breath: No  visual disturbance: No  weakness: No  pelvic pain: No  vaginal bleeding: No  vaginal discharge: No  tenderness: No  breast mass: No  breast discharge: No  Additional concerns today:: No  Duration of exercise:: 45-60 minutes

## 2021-09-25 ENCOUNTER — HEALTH MAINTENANCE LETTER (OUTPATIENT)
Age: 36
End: 2021-09-25

## 2023-01-07 ENCOUNTER — HEALTH MAINTENANCE LETTER (OUTPATIENT)
Age: 38
End: 2023-01-07

## 2023-10-02 ENCOUNTER — MYC REFILL (OUTPATIENT)
Dept: FAMILY MEDICINE | Facility: CLINIC | Age: 38
End: 2023-10-02
Payer: COMMERCIAL

## 2023-10-02 DIAGNOSIS — L30.9 ECZEMA, UNSPECIFIED TYPE: ICD-10-CM

## 2023-10-06 RX ORDER — BETAMETHASONE DIPROPIONATE 0.5 MG/G
CREAM TOPICAL
Qty: 50 G | Refills: 0 | Status: SHIPPED | OUTPATIENT
Start: 2023-10-06

## 2023-10-11 ASSESSMENT — ENCOUNTER SYMPTOMS
DIZZINESS: 0
MYALGIAS: 0
JOINT SWELLING: 0
CONSTIPATION: 0
FREQUENCY: 0
WEAKNESS: 0
ARTHRALGIAS: 0
SHORTNESS OF BREATH: 0
BREAST MASS: 0
HEMATURIA: 0
NERVOUS/ANXIOUS: 0
HEARTBURN: 0
PARESTHESIAS: 0
PALPITATIONS: 0
EYE PAIN: 0
DIARRHEA: 0
SORE THROAT: 0
COUGH: 0
CHILLS: 0
HEADACHES: 0
HEMATOCHEZIA: 0
NAUSEA: 0
FEVER: 0
DYSURIA: 0
ABDOMINAL PAIN: 0

## 2023-10-12 ENCOUNTER — OFFICE VISIT (OUTPATIENT)
Dept: FAMILY MEDICINE | Facility: CLINIC | Age: 38
End: 2023-10-12
Payer: COMMERCIAL

## 2023-10-12 VITALS
TEMPERATURE: 97.9 F | RESPIRATION RATE: 16 BRPM | BODY MASS INDEX: 29.38 KG/M2 | HEIGHT: 66 IN | SYSTOLIC BLOOD PRESSURE: 110 MMHG | OXYGEN SATURATION: 98 % | HEART RATE: 63 BPM | WEIGHT: 182.8 LBS | DIASTOLIC BLOOD PRESSURE: 76 MMHG

## 2023-10-12 DIAGNOSIS — Z00.00 ROUTINE GENERAL MEDICAL EXAMINATION AT A HEALTH CARE FACILITY: Primary | ICD-10-CM

## 2023-10-12 PROCEDURE — 99395 PREV VISIT EST AGE 18-39: CPT | Performed by: NURSE PRACTITIONER

## 2023-10-12 ASSESSMENT — PAIN SCALES - GENERAL: PAINLEVEL: NO PAIN (0)

## 2023-10-12 NOTE — PATIENT INSTRUCTIONS
Call to schedule lab [phone]    Preventive Health Recommendations  Female Ages 26 - 39  Yearly exam:   See your health care provider every year in order to  Review health changes.   Discuss preventive care.    Review your medicines if you your doctor has prescribed any.    Until age 30: Get a Pap test every three years (more often if you have had an abnormal result).    After age 30: Talk to your doctor about whether you should have a Pap test every 3 years or have a Pap test with HPV screening every 5 years.   You do not need a Pap test if your uterus was removed (hysterectomy) and you have not had cancer.  You should be tested each year for STDs (sexually transmitted diseases), if you're at risk.   Talk to your provider about how often to have your cholesterol checked.  If you are at risk for diabetes, you should have a diabetes test (fasting glucose).  Shots: Get a flu shot each year. Get a tetanus shot every 10 years.   Nutrition:   Eat at least 5 servings of fruits and vegetables each day.  Eat whole-grain bread, whole-wheat pasta and brown rice instead of white grains and rice.  Get adequate Calcium and Vitamin D.     Lifestyle  Exercise at least 150 minutes a week (30 minutes a day, 5 days of the week). This will help you control your weight and prevent disease.  Limit alcohol to one drink per day.  No smoking.   Wear sunscreen to prevent skin cancer.  See your dentist every six months for an exam and cleaning.

## 2023-10-12 NOTE — PROGRESS NOTES
SUBJECTIVE:   CC: Frieda is an 38 year old who presents for preventive health visit.       10/12/2023    11:08 AM   Additional Questions   Roomed by roni MARTINEZ    Today's PHQ-2 Score:       10/11/2023    12:12 PM   PHQ-2 ( 1999 Pfizer)   Q1: Little interest or pleasure in doing things 0   Q2: Feeling down, depressed or hopeless 0   PHQ-2 Score 0   Q1: Little interest or pleasure in doing things Not at all   Q2: Feeling down, depressed or hopeless Not at all   PHQ-2 Score 0               ave you ever done Advance Care Planning? (For example, a Health Directive, POLST, or a discussion with a medical provider or your loved ones about your wishes): No, advance care planning information given to patient to review.  Patient plans to discuss their wishes with loved ones or provider.      Social History     Tobacco Use    Smoking status: Never    Smokeless tobacco: Never   Substance Use Topics    Alcohol use: Yes     Comment: occassionally when not pregnant.             10/11/2023    12:12 PM   Alcohol Use   Prescreen: >3 drinks/day or >7 drinks/week? No     Reviewed orders with patient.  Reviewed health maintenance and updated orders accordingly - Yes      Breast Cancer Screening:    [facility]-7:       8/28/2021     6:59 PM 10/11/2023    12:15 PM   Breast CA Risk Assessment ([facility]-7)   Did any of your first-degree relatives have breast or ovarian cancer? No No   Did any of your relatives have bilateral breast cancer? Unknown Unknown   Did any man in your family have breast cancer? No No   Did any woman in your family have breast and ovarian cancer? Yes Yes   Did any woman in your family have breast cancer before age 50 y? No No   Do you have 2 or more relatives with breast and/or ovarian cancer? No No   Do you have 2 or more relatives with breast and/or bowel cancer? No No     click delete button to remove this line now  Patient under 40 years of age: Routine Mammogram Screening not recommended.   Pertinent mammograms are  UTI will treat with macrobid "reviewed under the imaging tab.    History of abnormal Pap smear: NO - age 30-65 PAP every 5 years with negative HPV co-testing recommended      Latest Ref Rng & Units 1/16/2020     8:26 AM 9/29/2016     9:35 AM 9/29/2016    12:00 AM   PAP / HPV   PAP (Historical)  NIL   NIL    HPV 16 DNA NEG^Negative Negative  Negative     HPV 18 DNA NEG^Negative Negative  Negative     Other HR HPV NEG^Negative Negative  Negative       Reviewed and updated as needed this visit by clinical staff   Tobacco  Allergies  Meds              Reviewed and updated as needed this visit by Provider                 Past Medical History:   Diagnosis Date    Eczema     Encounter for IUD insertion 3/17/2015    Mirena IUD placed.      Past Surgical History:   Procedure Laterality Date    HC TOOTH EXTRACTION W/FORCEP      wisdom teeth       Review of Systems  CONSTITUTIONAL: NEGATIVE for fever, chills, change in weight  INTEGUMENTARU/SKIN: NEGATIVE for worrisome rashes, moles or lesions  EYES: NEGATIVE for vision changes or irritation  ENT: NEGATIVE for ear, mouth and throat problems  RESP: NEGATIVE for significant cough or SOB  BREAST: NEGATIVE for masses, tenderness or discharge  CV: NEGATIVE for chest pain, palpitations or peripheral edema  GI: NEGATIVE for nausea, abdominal pain, heartburn, or change in bowel habits  : NEGATIVE for unusual urinary or vaginal symptoms. Periods are regular.  MUSCULOSKELETAL: NEGATIVE for significant arthralgias or myalgia  NEURO: NEGATIVE for weakness, dizziness or paresthesias  PSYCHIATRIC: NEGATIVE for changes in mood or affect     OBJECTIVE:   /76 (Cuff Size: Adult Regular)   Pulse 63   Temp 97.9  F (36.6  C) (Temporal)   Resp 16   Ht 1.676 m (5' 6\")   Wt 82.9 kg (182 lb 12.8 oz)   LMP 10/07/2023 (Exact Date)   SpO2 98%   BMI 29.50 kg/m    Physical Exam  GENERAL: healthy, alert and no distress  EYES: Eyes grossly normal to inspection, PERRL and conjunctivae and sclerae normal  HENT: ear " "canals and TM's normal, nose and mouth without ulcers or lesions  NECK: no adenopathy, no asymmetry, masses, or scars and thyroid normal to palpation  RESP: lungs clear to auscultation - no rales, rhonchi or wheezes  CV: regular rate and rhythm, normal S1 S2, no S3 or S4, no murmur, click or rub, no peripheral edema and peripheral pulses strong  ABDOMEN: soft, nontender, no hepatosplenomegaly, no masses and bowel sounds normal  MS: no gross musculoskeletal defects noted, no edema  SKIN: no suspicious lesions or rashes  NEURO: Normal strength and tone, mentation intact and speech normal  PSYCH: mentation appears normal, affect normal/bright    Diagnostic Test Results:  Labs reviewed in Epic  none     ASSESSMENT/PLAN:   (Z00.00) Routine general medical examination at a health care facility  (primary encounter diagnosis)  Comment: recommend yearly physicals.  Not fasting today  Plan: Lipid panel reflex to direct LDL Fasting,         Glucose          Patient has been advised of split billing requirements and indicates understanding: Yes      COUNSELING:  Reviewed preventive health counseling, as reflected in patient instructions      BMI:   Estimated body mass index is 29.5 kg/m  as calculated from the following:    Height as of this encounter: 1.676 m (5' 6\").    Weight as of this encounter: 82.9 kg (182 lb 12.8 oz).   Weight management plan: Discussed healthy diet and exercise guidelines      She reports that she has never smoked. She has never used smokeless tobacco.          Selma Zurita NP  [facility]  "

## 2023-10-16 ENCOUNTER — LAB (OUTPATIENT)
Dept: LAB | Facility: CLINIC | Age: 38
End: 2023-10-16
Payer: COMMERCIAL

## 2023-10-16 DIAGNOSIS — Z00.00 ROUTINE GENERAL MEDICAL EXAMINATION AT A HEALTH CARE FACILITY: ICD-10-CM

## 2023-10-16 LAB
CHOLEST SERPL-MCNC: 203 MG/DL
FASTING STATUS PATIENT QL REPORTED: YES
GLUCOSE SERPL-MCNC: 98 MG/DL (ref 70–99)
HDLC SERPL-MCNC: 75 MG/DL
LDLC SERPL CALC-MCNC: 111 MG/DL
NONHDLC SERPL-MCNC: 128 MG/DL
TRIGL SERPL-MCNC: 86 MG/DL

## 2023-10-16 PROCEDURE — 36415 COLL VENOUS BLD VENIPUNCTURE: CPT

## 2023-10-16 PROCEDURE — 80061 LIPID PANEL: CPT

## 2023-10-16 PROCEDURE — 82947 ASSAY GLUCOSE BLOOD QUANT: CPT

## 2024-09-06 ENCOUNTER — MYC MEDICAL ADVICE (OUTPATIENT)
Dept: FAMILY MEDICINE | Facility: CLINIC | Age: 39
End: 2024-09-06
Payer: COMMERCIAL

## 2024-09-06 DIAGNOSIS — Z01.00 EXAMINATION OF EYES AND VISION: Primary | ICD-10-CM

## 2024-09-12 ENCOUNTER — PATIENT OUTREACH (OUTPATIENT)
Dept: CARE COORDINATION | Facility: CLINIC | Age: 39
End: 2024-09-12
Payer: COMMERCIAL

## 2024-09-26 ENCOUNTER — PATIENT OUTREACH (OUTPATIENT)
Dept: CARE COORDINATION | Facility: CLINIC | Age: 39
End: 2024-09-26
Payer: COMMERCIAL

## 2025-01-04 ENCOUNTER — HEALTH MAINTENANCE LETTER (OUTPATIENT)
Age: 40
End: 2025-01-04